# Patient Record
Sex: FEMALE | Race: WHITE | Employment: FULL TIME | URBAN - METROPOLITAN AREA
[De-identification: names, ages, dates, MRNs, and addresses within clinical notes are randomized per-mention and may not be internally consistent; named-entity substitution may affect disease eponyms.]

---

## 2017-01-09 ENCOUNTER — OFFICE VISIT (OUTPATIENT)
Dept: FAMILY MEDICINE | Facility: CLINIC | Age: 52
End: 2017-01-09
Payer: COMMERCIAL

## 2017-01-09 VITALS
TEMPERATURE: 98.5 F | HEIGHT: 62 IN | HEART RATE: 78 BPM | DIASTOLIC BLOOD PRESSURE: 85 MMHG | SYSTOLIC BLOOD PRESSURE: 129 MMHG | RESPIRATION RATE: 16 BRPM | BODY MASS INDEX: 41.41 KG/M2 | WEIGHT: 225 LBS

## 2017-01-09 DIAGNOSIS — R13.10 DYSPHAGIA, UNSPECIFIED TYPE: Primary | ICD-10-CM

## 2017-01-09 PROCEDURE — 99213 OFFICE O/P EST LOW 20 MIN: CPT | Performed by: NURSE PRACTITIONER

## 2017-01-09 NOTE — NURSING NOTE
"Chief Complaint   Patient presents with     Gastrointestinal Problem     Trouble Swallowing       Initial /91 mmHg  Pulse 80  Temp(Src) 98.5  F (36.9  C) (Tympanic)  Resp 16  Ht 5' 1.75\" (1.568 m)  Wt 225 lb (102.059 kg)  BMI 41.51 kg/m2  LMP 04/01/2010 Estimated body mass index is 41.51 kg/(m^2) as calculated from the following:    Height as of this encounter: 5' 1.75\" (1.568 m).    Weight as of this encounter: 225 lb (102.059 kg).  BP completed using cuff size: dale Arriaga, CMA      "

## 2017-01-09 NOTE — PROGRESS NOTES
SUBJECTIVE:                                                    Giselle Solis is a 51 year old female who presents to clinic today for the following health issues:      Gastrointestinal symptoms      Duration: 3 weeks     Description:           REFLUX SYMPTOMS - heartburn, nausea, problems swallowing solids and problems swallowing solids and liquids    Intensity:  moderate    Accompanying signs and symptoms:  none    History  Previous  similar problem: no   Previous evaluation:  none    Aggravating factors: meals, meat     Alleviating factors: nothing    Other Therapies tried: None       Problem list and histories reviewed & adjusted, as indicated.    Additional history:   For years can get a sensation of a knot in her upper chest when eating. Has never caused trouble with swallowing.  Was eating dinner 12/24/2016 when the food would not go down and she vomited it back up. She was eating meat but says it was well chewed and came up looking the same.   Since then has had similar episodes not as severe. Sometimes even liquids are hard to go down. Rare heartburn. No change in BM's.  Not under stress. Works from home.     Patient Active Problem List   Diagnosis     HYPERLIPIDEMIA LDL GOAL <130     Scalp psoriasis     Generalized anxiety disorder     Inverse psoriasis     Essential hypertension, benign     Obesity, Class II, BMI 35-39.9 (H)     Past Surgical History   Procedure Laterality Date     No history of surgery       Colonoscopy N/A 2/16/2015     Procedure: COLONOSCOPY;  Surgeon: Andrey Ryan MD;  Location:  GI       Social History   Substance Use Topics     Smoking status: Never Smoker      Smokeless tobacco: Never Used     Alcohol Use: No     Family History   Problem Relation Age of Onset     Adopted: Yes         Current Outpatient Prescriptions   Medication Sig Dispense Refill     omeprazole (PRILOSEC) 20 MG CR capsule Take 1 capsule (20 mg) by mouth 2 times daily 60 capsule 1     losartan (COZAAR)  "50 MG tablet Take 1 tablet (50 mg) by mouth daily 90 tablet 1     citalopram (CELEXA) 20 MG tablet TAKE 1 TABLET DAILY 90 tablet 2     calcium carbonate (OS-MADELIN 500 MG [place]. CA) 500 MG tablet Take 500 mg by mouth 2 times daily       Misc Natural Products (OSTEO BI-FLEX ADV TRIPLE ST) TABS Take 2 capsules by mouth.       MULTI-VITAMIN OR TABS 1 TABLET DAILY       ASPIRIN 81 MG OR TABS ONE DAILY         ROS:  C: NEGATIVE for fever, chills, change in weight  E/M: NEGATIVE for ear, mouth and throat problems  R: NEGATIVE for significant cough or SOB  CV: NEGATIVE for chest pain, palpitations or peripheral edema    OBJECTIVE:                                                    /85 mmHg  Pulse 78  Temp(Src) 98.5  F (36.9  C) (Tympanic)  Resp 16  Ht 5' 1.75\" (1.568 m)  Wt 225 lb (102.059 kg)  BMI 41.51 kg/m2  LMP 04/01/2010  Body mass index is 41.51 kg/(m^2).   GENERAL: healthy, alert, well nourished, well hydrated, no distress  HENT: ear canals- normal; TMs- normal; Nose- normal; Mouth- no ulcers, no lesions  NECK: no tenderness, no adenopathy, no asymmetry, no masses, no stiffness; thyroid- normal to palpation  RESP: lungs clear to auscultation - no rales, no rhonchi, no wheezes  CV: regular rates and rhythm, normal S1 S2, no S3 or S4 and no murmur, no click or rub   ABDOMEN: soft, no tenderness, no  hepatosplenomegaly, no masses, normal bowel sounds         ASSESSMENT/PLAN:                                                        ICD-10-CM    1. Dysphagia, unspecified type R13.10 omeprazole (PRILOSEC) 20 MG CR capsule       Reluctant to have UGI as advised due to cost. Has high deductible insurnace and just finished paying off a large bill with [place] [facility] for elevated liver function tests. Test have gone back to normal.   Will try omeprazole 20mg BID and assess  Call if this doesn't work. Will then need to have UGI     ARACELIS Hogan [facility]      "

## 2017-01-09 NOTE — MR AVS SNAPSHOT
"              After Visit Summary   1/9/2017    Giselle Solis    MRN: [id number]           Patient Information     Date Of Birth          1965        Visit Information        Provider Department      1/9/2017 3:00 PM Samantha Varela APRN CNP [facility] [place]        Today's Diagnoses     Dysphagia, unspecified type    -  1        Follow-ups after your visit        Who to contact     If you have questions or need follow up information about today's clinic visit or your schedule please contact [facility] [place] directly at [phone].  Normal or non-critical lab and imaging results will be communicated to you by Bet[employer], letter or phone within 4 business days after the clinic has received the results. If you do not hear from us within 7 days, please contact the clinic through Betablehart or phone. If you have a critical or abnormal lab result, we will notify you by phone as soon as possible.  Submit refill requests through [employer] or call your pharmacy and they will forward the refill request to us. Please allow 3 business days for your refill to be completed.          Additional Information About Your Visit        MyChart Information     [employer] gives you secure access to your electronic health record. If you see a primary care provider, you can also send messages to your care team and make appointments. If you have questions, please call your primary care clinic.  If you do not have a primary care provider, please call [phone] and they will assist you.        Care EveryWhere ID     This is your Care EveryWhere ID. This could be used by other organizations to access your [place] medical records  [id number]        Your Vitals Were     Pulse Temperature Respirations Height BMI (Body Mass Index) Last Period    80 98.5  F (36.9  C) (Tympanic) 16 5' 1.75\" (1.568 m) 41.51 kg/m2 04/01/2010       Blood Pressure from Last 3 Encounters:   01/09/17 137/91   06/23/16 125/79   02/25/16 104/71    " Weight from Last 3 Encounters:   01/09/17 225 lb (102.059 kg)   06/23/16 202 lb (91.627 kg)   02/25/16 174 lb (78.926 kg)              Today, you had the following     No orders found for display         Today's Medication Changes          These changes are accurate as of: 1/9/17  3:38 PM.  If you have any questions, ask your nurse or doctor.               Start taking these medicines.        Dose/Directions    omeprazole 20 MG CR capsule   Commonly known as:  priLOSEC   Used for:  Dysphagia, unspecified type   Started by:  Samantha Varela APRN CNP        Dose:  20 mg   Take 1 capsule (20 mg) by mouth 2 times daily   Quantity:  60 capsule   Refills:  1            Where to get your medicines      These medications were sent to [facility][id number] - [place], [place] [address], [place] [address]     Phone:  [phone]    - omeprazole 20 MG CR capsule             Primary Care Provider Office Phone # Fax #    ARACELIS Hogan [phone]       [address]  [place] [address]        Thank you!     [facility] you [facility]  [facility] your [facility]. Our goal is always to provide you with excellent care. Hearing back from our patients is one way we can continue to improve our services. Please take a few minutes to complete the written survey that you may receive in the mail after your visit with us. Thank you!             Your Updated Medication List - Protect others around you: Learn how to safely use, store and throw away your medicines at www.disposemymeds.org.          This list is accurate as of: 1/9/17  3:38 PM.  Always use your most recent med list.                   Brand Name Dispense Instructions for use    aspirin 81 MG tablet      ONE DAILY       calcium carbonate 500 MG tablet    OS-MADELIN 500 mg [place]. [place]     Take 500 mg by mouth 2 times daily       citalopram 20 MG tablet    celeXA    90  tablet    TAKE 1 TABLET DAILY       losartan 50 MG tablet    COZAAR    90 tablet    Take 1 tablet (50 mg) by mouth daily       Multi-vitamin Tabs tablet   Generic drug:  multivitamin, therapeutic with minerals      1 TABLET DAILY       omeprazole 20 MG CR capsule    priLOSEC    60 capsule    Take 1 capsule (20 mg) by mouth 2 times daily       OSTEO BI-FLEX ADV TRIPLE ST Tabs      Take 2 capsules by mouth.

## 2017-01-26 ENCOUNTER — TRANSFERRED RECORDS (OUTPATIENT)
Dept: HEALTH INFORMATION MANAGEMENT | Facility: CLINIC | Age: 52
End: 2017-01-26

## 2017-02-08 DIAGNOSIS — R13.10 DYSPHAGIA, UNSPECIFIED TYPE: Primary | ICD-10-CM

## 2017-02-08 NOTE — TELEPHONE ENCOUNTER
omeprazole (PRILOSEC) 20 MG CR capsule  Last Written Prescription Date: 1-9-2017  Last Fill Quantity: 60,  # refills: 1   Last Office Visit with [facility], [facility] or [facility] prescribing provider: 1-9-2017

## 2017-02-08 NOTE — TELEPHONE ENCOUNTER
Rx denied, patient has refills on file.   Nafisa Alcantar RN   [facility] - Triage

## 2017-04-18 DIAGNOSIS — F41.1 GENERALIZED ANXIETY DISORDER: ICD-10-CM

## 2017-04-18 NOTE — TELEPHONE ENCOUNTER
citalopram (CELEXA) 20 MG     Last Written Prescription Date: 8/15/16  Last Fill Quantity: 90, # refills: 2  Last Office Visit with [facility] primary care provider:  1/9/17   Next 5 appointments (look out 90 days)     Jun 30, 2017  9:00 AM CDT   MyChart Physical Adult with ARACELIS Hogan CNP   [facility] ([facility])    [address] [place] [id number] [phone]                   Last PHQ-9 score on record=   PHQ-9 SCORE 12/6/2013   Total Score 3

## 2017-04-19 RX ORDER — CITALOPRAM HYDROBROMIDE 20 MG/1
20 TABLET ORAL DAILY
Qty: 90 TABLET | Refills: 2 | Status: SHIPPED | OUTPATIENT
Start: 2017-04-19 | End: 2018-01-30

## 2017-04-19 NOTE — TELEPHONE ENCOUNTER
Refill approved through [facility] protocol.  Heather Gould RN  [facility]  [phone]

## 2017-05-02 DIAGNOSIS — I10 HYPERTENSION GOAL BP (BLOOD PRESSURE) < 140/90: ICD-10-CM

## 2017-05-03 RX ORDER — LOSARTAN POTASSIUM 50 MG/1
50 TABLET ORAL DAILY
Qty: 90 TABLET | Refills: 0 | Status: SHIPPED | OUTPATIENT
Start: 2017-05-03 | End: 2017-06-30

## 2017-05-03 NOTE — TELEPHONE ENCOUNTER
Medication is being filled for 1 time refill only due to:  for coverage until next scheduled appt   Next 5 appointments (look out 90 days)     Jun 30, 2017  9:00 AM CDT   MyChart Physical Adult with ARACELIS Hogan CNP   [facility] ([facility])    [address] [place] [id number]   [phone]                Yadi Talley RN

## 2017-05-03 NOTE — TELEPHONE ENCOUNTER
losartan (COZAAR) 50 MG tablet      Last Written Prescription Date: 10/27/2016  Last Fill Quantity: 90, # refills: 1  Last Office Visit with [facility], [facility] or [facility] prescribing provider: 1/9/2017  Next 5 appointments (look out 90 days)     Jun 30, 2017  9:00 AM CDT   [facility] Physical Adult with ARACELIS Hogan CNP   [facility]    [address] [place] [id number] [phone]                   Potassium   Date Value Ref Range Status   03/17/2016 3.8 3.4 - 5.3 mmol/L Final     Creatinine   Date Value Ref Range Status   03/17/2016 0.71 0.52 - 1.04 mg/dL Final     BP Readings from Last 3 Encounters:   01/09/17 129/85   06/23/16 125/79   02/25/16 104/71

## 2017-05-05 DIAGNOSIS — R13.10 DYSPHAGIA, UNSPECIFIED TYPE: ICD-10-CM

## 2017-05-05 NOTE — TELEPHONE ENCOUNTER
Prescription approved per [facility], [facility] or [facility] refill protocol.  Manju Self RN  [facility] [employer]

## 2017-05-05 NOTE — TELEPHONE ENCOUNTER
Omeprazole      Last Written Prescription Date: 2/10/17  Last Fill Quantity: 90,  # refills: 0   Last Office Visit with [facility], [facility] or [facility] prescribing provider:       1/9/17                                 Next 5 appointments (look out 90 days)     Jun 30, 2017  9:00 AM CDT   [place] Physical Adult with ARACELIS Hogan CNP   [facility]    [address] [place] [id number] [phone]                  Taylor Morales CMA

## 2017-06-28 ASSESSMENT — PATIENT HEALTH QUESTIONNAIRE - PHQ9
10. IF YOU CHECKED OFF ANY PROBLEMS, HOW DIFFICULT HAVE THESE PROBLEMS MADE IT FOR YOU TO DO YOUR WORK, TAKE CARE OF THINGS AT HOME, OR GET ALONG WITH OTHER PEOPLE: NOT DIFFICULT AT ALL
SUM OF ALL RESPONSES TO PHQ QUESTIONS 1-9: 5
SUM OF ALL RESPONSES TO PHQ QUESTIONS 1-9: 5

## 2017-06-30 ENCOUNTER — OFFICE VISIT (OUTPATIENT)
Dept: FAMILY MEDICINE | Facility: CLINIC | Age: 52
End: 2017-06-30
Payer: COMMERCIAL

## 2017-06-30 VITALS
SYSTOLIC BLOOD PRESSURE: 124 MMHG | OXYGEN SATURATION: 95 % | HEIGHT: 61 IN | WEIGHT: 229 LBS | HEART RATE: 86 BPM | TEMPERATURE: 98.1 F | BODY MASS INDEX: 43.23 KG/M2 | DIASTOLIC BLOOD PRESSURE: 86 MMHG

## 2017-06-30 DIAGNOSIS — E66.01 MORBID OBESITY WITH BMI OF 40.0-44.9, ADULT (H): ICD-10-CM

## 2017-06-30 DIAGNOSIS — L40.8 INVERSE PSORIASIS: ICD-10-CM

## 2017-06-30 DIAGNOSIS — Z00.00 ENCOUNTER FOR ROUTINE ADULT HEALTH EXAMINATION WITHOUT ABNORMAL FINDINGS: Primary | ICD-10-CM

## 2017-06-30 DIAGNOSIS — L40.9 SCALP PSORIASIS: ICD-10-CM

## 2017-06-30 DIAGNOSIS — Z91.89 LACK OF MOTIVATION: ICD-10-CM

## 2017-06-30 DIAGNOSIS — E78.5 HYPERLIPIDEMIA LDL GOAL <130: ICD-10-CM

## 2017-06-30 DIAGNOSIS — F41.1 GENERALIZED ANXIETY DISORDER: ICD-10-CM

## 2017-06-30 DIAGNOSIS — I10 ESSENTIAL HYPERTENSION, BENIGN: ICD-10-CM

## 2017-06-30 LAB
ALBUMIN SERPL-MCNC: 3.4 G/DL (ref 3.4–5)
ALP SERPL-CCNC: 86 U/L (ref 40–150)
ALT SERPL W P-5'-P-CCNC: 30 U/L (ref 0–50)
ANION GAP SERPL CALCULATED.3IONS-SCNC: 7 MMOL/L (ref 3–14)
AST SERPL W P-5'-P-CCNC: 15 U/L (ref 0–45)
BILIRUB SERPL-MCNC: 0.5 MG/DL (ref 0.2–1.3)
BUN SERPL-MCNC: 16 MG/DL (ref 7–30)
CALCIUM SERPL-MCNC: 9 MG/DL (ref 8.5–10.1)
CHLORIDE SERPL-SCNC: 106 MMOL/L (ref 94–109)
CHOLEST SERPL-MCNC: 254 MG/DL
CO2 SERPL-SCNC: 27 MMOL/L (ref 20–32)
CREAT SERPL-MCNC: 0.83 MG/DL (ref 0.52–1.04)
GFR SERPL CREATININE-BSD FRML MDRD: 71 ML/MIN/1.7M2
GLUCOSE SERPL-MCNC: 100 MG/DL (ref 70–99)
HDLC SERPL-MCNC: 50 MG/DL
LDLC SERPL CALC-MCNC: 183 MG/DL
NONHDLC SERPL-MCNC: 204 MG/DL
POTASSIUM SERPL-SCNC: 4.1 MMOL/L (ref 3.4–5.3)
PROT SERPL-MCNC: 7.9 G/DL (ref 6.8–8.8)
SODIUM SERPL-SCNC: 140 MMOL/L (ref 133–144)
TRIGL SERPL-MCNC: 105 MG/DL
TSH SERPL DL<=0.005 MIU/L-ACNC: 1.45 MU/L (ref 0.4–4)

## 2017-06-30 PROCEDURE — 99212 OFFICE O/P EST SF 10 MIN: CPT | Mod: 25 | Performed by: NURSE PRACTITIONER

## 2017-06-30 PROCEDURE — 80053 COMPREHEN METABOLIC PANEL: CPT | Performed by: NURSE PRACTITIONER

## 2017-06-30 PROCEDURE — 84443 ASSAY THYROID STIM HORMONE: CPT | Performed by: NURSE PRACTITIONER

## 2017-06-30 PROCEDURE — 80061 LIPID PANEL: CPT | Performed by: NURSE PRACTITIONER

## 2017-06-30 PROCEDURE — 36415 COLL VENOUS BLD VENIPUNCTURE: CPT | Performed by: NURSE PRACTITIONER

## 2017-06-30 PROCEDURE — 99396 PREV VISIT EST AGE 40-64: CPT | Performed by: NURSE PRACTITIONER

## 2017-06-30 RX ORDER — LOSARTAN POTASSIUM 50 MG/1
50 TABLET ORAL DAILY
Qty: 90 TABLET | Refills: 3 | Status: SHIPPED | OUTPATIENT
Start: 2017-06-30 | End: 2018-07-25

## 2017-06-30 RX ORDER — BUPROPION HYDROCHLORIDE 150 MG/1
150 TABLET ORAL EVERY MORNING
Qty: 30 TABLET | Refills: 1 | Status: SHIPPED | OUTPATIENT
Start: 2017-06-30 | End: 2017-07-31

## 2017-06-30 NOTE — NURSING NOTE
"Chief Complaint   Patient presents with     Physical     Fasting        Initial /86 (BP Location: Left arm)  Pulse 86  Temp 98.1  F (36.7  C) (Tympanic)  Ht 5' 1.5\" (1.562 m)  Wt 229 lb (103.9 kg)  LMP 04/01/2010  SpO2 95%  BMI 42.57 kg/m2 Estimated body mass index is 42.57 kg/(m^2) as calculated from the following:    Height as of this encounter: 5' 1.5\" (1.562 m).    Weight as of this encounter: 229 lb (103.9 kg).  Medication Reconciliation: complete    Current Outpatient Prescriptions   Medication Sig Dispense Refill     omeprazole (PRILOSEC) 20 MG CR capsule TAKE 1 CAPSULE TWICE A DAY 90 capsule 1     losartan (COZAAR) 50 MG tablet Take 1 tablet (50 mg) by mouth daily .  Need appointment for further refill 90 tablet 0     citalopram (CELEXA) 20 MG tablet Take 1 tablet (20 mg) by mouth daily 90 tablet 2     calcium carbonate (OS-MADELIN 500 MG [place]. CA) 500 MG tablet Take 500 mg by mouth 2 times daily       Misc Natural Products (OSTEO BI-FLEX ADV TRIPLE ST) TABS Take 2 capsules by mouth.       MULTI-VITAMIN OR TABS 1 TABLET DAILY       ASPIRIN 81 MG OR TABS ONE DAILY         David PALOMO CMA  "

## 2017-06-30 NOTE — NURSING NOTE
Prescription of losartan was faxed to [facility]  Date:June 30, 2017  Signature:Adenike HUNT

## 2017-06-30 NOTE — MR AVS SNAPSHOT
After Visit Summary   6/30/2017    Giselle Solis    MRN: [id number]           Patient Information     Date Of Birth          1965        Visit Information        Provider Department      6/30/2017 9:00 AM Samantha Varela APRN CNP [facility]        Today's Diagnoses     Encounter for routine adult health examination without abnormal findings    -  1    Hypertension goal BP (blood pressure) < 140/90        Hyperlipidemia LDL goal <130        Lack of motivation           Follow-ups after your visit        Who to contact     If you have questions or need follow up information about today's clinic visit or your schedule please contact [facility] directly at [phone].  Normal or non-critical lab and imaging results will be communicated to you by MyChart, letter or phone within 4 business days after the clinic has received the results. If you do not hear from us within 7 days, please contact the clinic through [employer] or phone. If you have a critical or abnormal lab result, we will notify you by phone as soon as possible.  Submit refill requests through [employer] or call your pharmacy and they will forward the refill request to us. Please allow 3 business days for your refill to be completed.          Additional Information About Your Visit        MyChart Information     [employer] gives you secure access to your electronic health record. If you see a primary care provider, you can also send messages to your care team and make appointments. If you have questions, please call your primary care clinic.  If you do not have a primary care provider, please call [phone] and they will assist you.        Care EveryWhere ID     This is your Care EveryWhere ID. This could be used by other organizations to access your [place] medical records  [id number]        Your Vitals Were     Pulse Temperature Height Last Period Pulse Oximetry BMI (Body Mass Index)    86 98.1  F  "(36.7  C) (Tympanic) 5' 1.5\" (1.562 m) 04/01/2010 95% 42.57 kg/m2       Blood Pressure from Last 3 Encounters:   06/30/17 124/86   01/09/17 129/85   06/23/16 125/79    Weight from Last 3 Encounters:   06/30/17 229 lb (103.9 kg)   01/09/17 225 lb (102.1 kg)   06/23/16 202 lb (91.6 kg)              We Performed the Following     Comprehensive metabolic panel     Lipid Profile with reflex to direct LDL     TSH with free T4 reflex          Today's Medication Changes          These changes are accurate as of: 6/30/17  9:34 AM.  If you have any questions, ask your nurse or doctor.               Start taking these medicines.        Dose/Directions    buPROPion 150 MG 24 hr tablet   Commonly known as:  WELLBUTRIN XL   Used for:  Lack of motivation   Started by:  Samantha Varela APRN CNP        Dose:  150 mg   Take 1 tablet (150 mg) by mouth every morning   Quantity:  30 tablet   Refills:  1         These medicines have changed or have updated prescriptions.        Dose/Directions    losartan 50 MG tablet   Commonly known as:  COZAAR   This may have changed:  additional instructions   Used for:  Hypertension goal BP (blood pressure) < 140/90   Changed by:  Samantha Varela APRN CNP        Dose:  50 mg   Take 1 tablet (50 mg) by mouth daily   Quantity:  90 tablet   Refills:  3            Where to get your medicines      These medications were sent to [facility] #[id number] - [place], [place] [address], [place] [address]     Phone:  [phone]     buPROPion 150 MG 24 hr tablet         Some of these will need a paper prescription and others can be bought over the counter.  Ask your nurse if you have questions.     Bring a paper prescription for each of these medications     losartan 50 MG tablet                Primary Care Provider Office Phone # Fax #    ARACELIS Hogan [phone]       [address]  [place] [address]      "   Equal Access to Services     [facility] Hospital[facility] : Hadii eduard vasquez romariomelvin Kavonali, wacaterinada luqadaha, qaybta kapilivasiliy lees. So [facility] [phone].    ATENCIÓN: Si habla español, tiene a pathak disposición servicios gratuitos de asistencia lingüística. Rachame al [phone].    We comply with applicable federal civil rights laws and [place] laws. We do not discriminate on the basis of race, color, national origin, age, disability sex, sexual orientation or gender identity.            Thank you!     Thank you for choosing [facility] [place]  for your care. Our goal is always to provide you with excellent care. Hearing back from our patients is one way we can continue to improve our services. Please take a few minutes to complete the written survey that you may receive in the mail after your visit with us. Thank you!             Your Updated Medication List - Protect others around you: Learn how to safely use, store and throw away your medicines at www.disposemymeds.org.          This list is accurate as of: 6/30/17  9:34 AM.  Always use your most recent med list.                   Brand Name Dispense Instructions for use Diagnosis    aspirin 81 MG tablet      ONE DAILY        buPROPion 150 MG 24 hr tablet    WELLBUTRIN XL    30 tablet    Take 1 tablet (150 mg) by mouth every morning    Lack of motivation       calcium carbonate 1250 MG tablet    OS-MADELIN 500 mg [place]. Ca     Take 500 mg by mouth 2 times daily        citalopram 20 MG tablet    celeXA    90 tablet    Take 1 tablet (20 mg) by mouth daily    Generalized anxiety disorder       losartan 50 MG tablet    COZAAR    90 tablet    Take 1 tablet (50 mg) by mouth daily    Hypertension goal BP (blood pressure) < 140/90       Multi-vitamin Tabs tablet   Generic drug:  multivitamin, therapeutic with minerals      1 TABLET DAILY        omeprazole 20 MG CR capsule    priLOSEC    90 capsule    TAKE 1 CAPSULE TWICE A DAY     Dysphagia, unspecified type       OSTEO BI-FLEX ADV TRIPLE ST Tabs      Take 2 capsules by mouth.

## 2017-06-30 NOTE — PROGRESS NOTES
"SUBJECTIVE:   CC: Giselle Solis is an 52 year old male who presents for preventative health visit.     Physical   Annual:     Getting at least 3 servings of Calcium per day::  NO    Bi-annual eye exam::  Yes    Dental care twice a year::  Yes    Sleep apnea or symptoms of sleep apnea::  None    Diet::  Regular (no restrictions)    Frequency of exercise::  1 day/week    Duration of exercise::  15-30 minutes    Taking medications regularly::  Yes    Medication side effects::  Not applicable    Additional concerns today::  YES          Today's PHQ-2 Score:   PHQ-2 ( 1999 [employer]) 6/28/2017   Q1: Little interest or pleasure in doing things 3   Q2: Feeling down, depressed or hopeless 0   PHQ-2 Score 3   Q1: Little interest or pleasure in doing things Nearly every day   Q2: Feeling down, depressed or hopeless Not at all   PHQ-2 Score 3       Abuse: Current or Past(Physical, Sexual or Emotional)- No  Do you feel safe in your environment - Yes    Social History   Substance Use Topics     Smoking status: Never Smoker     Smokeless tobacco: Never Used     Alcohol use No     The patient does not drink >3 drinks per day nor >7 drinks per week.    Last PSA: No results found for: PSA    Reviewed orders with patient. Reviewed health maintenance and updated orders accordingly - Yes  {Chronicprobdata (Optional):062878}    Reviewed and updated as needed this visit by clinical staff         Reviewed and updated as needed this visit by Provider        {HISTORY OPTIONS (Optional):575926}    ROS:  {MALE ROS-adult preventive care package:219952::\"C: NEGATIVE for fever, chills, change in weight\",\"I: NEGATIVE for worrisome rashes, moles or lesions\",\"E: NEGATIVE for vision changes or irritation\",\"ENT: NEGATIVE for ear, mouth and throat problems\",\"R: NEGATIVE for significant cough or SOB\",\"CV: NEGATIVE for chest pain, palpitations or peripheral edema\",\"GI: NEGATIVE for nausea, abdominal pain, heartburn, or change in bowel habits\",\" male: " "negative for dysuria, hematuria, decreased urinary stream, erectile dysfunction, urethral discharge\",\"M: NEGATIVE for significant arthralgias or myalgia\",\"N: NEGATIVE for weakness, dizziness or paresthesias\",\"P: NEGATIVE for changes in mood or affect\"}    OBJECTIVE:   LMP 04/01/2010    EXAM:  {Exam Choices:760925}    ASSESSMENT/PLAN:   {Diag Picklist:469862}    COUNSELING:   {MALE COUNSELING MESSAGES:091005::\"Reviewed preventive health counseling, as reflected in patient instructions\"}    {BP Counseling- Complete if BP >= 120/80  (Optional):663110}     reports that she has never smoked. She has never used smokeless tobacco.  {Tobacco Cessation -- Complete if patient is a smoker (Optional):873997}  Estimated body mass index is 41.49 kg/(m^2) as calculated from the following:    Height as of 1/9/17: 5' 1.75\" (1.568 m).    Weight as of 1/9/17: 225 lb (102.1 kg).   {Weight Management Plan (ACO) Complete if BMI is abnormal-  Ages 18-64  BMI >24.9.  Age 65+ with BMI <23 or >30 (Optional):385779}    Counseling Resources:  ATP IV Guidelines  Pooled Cohorts Equation Calculator  FRAX Risk Assessment  ICSI Preventive Guidelines  Dietary Guidelines for Americans, 2010  USDA's MyPlate  ASA Prophylaxis  Lung CA Screening    ARACELIS Hogan CNP  [facility] [place]  "

## 2017-07-02 ASSESSMENT — ANXIETY QUESTIONNAIRES
5. BEING SO RESTLESS THAT IT IS HARD TO SIT STILL: NOT AT ALL
7. FEELING AFRAID AS IF SOMETHING AWFUL MIGHT HAPPEN: NOT AT ALL
6. BECOMING EASILY ANNOYED OR IRRITABLE: NOT AT ALL
2. NOT BEING ABLE TO STOP OR CONTROL WORRYING: NOT AT ALL
IF YOU CHECKED OFF ANY PROBLEMS ON THIS QUESTIONNAIRE, HOW DIFFICULT HAVE THESE PROBLEMS MADE IT FOR YOU TO DO YOUR WORK, TAKE CARE OF THINGS AT HOME, OR GET ALONG WITH OTHER PEOPLE: NOT DIFFICULT AT ALL
3. WORRYING TOO MUCH ABOUT DIFFERENT THINGS: NOT AT ALL
1. FEELING NERVOUS, ANXIOUS, OR ON EDGE: NOT AT ALL
GAD7 TOTAL SCORE: 0

## 2017-07-02 ASSESSMENT — PATIENT HEALTH QUESTIONNAIRE - PHQ9: 5. POOR APPETITE OR OVEREATING: NOT AT ALL

## 2017-07-03 PROBLEM — E66.01 MORBID OBESITY WITH BMI OF 40.0-44.9, ADULT (H): Status: ACTIVE | Noted: 2017-07-03

## 2017-07-03 ASSESSMENT — ANXIETY QUESTIONNAIRES: GAD7 TOTAL SCORE: 0

## 2017-07-03 NOTE — PROGRESS NOTES
SUBJECTIVE:   CC: Giselle Solis is an 52 year old woman who presents for preventive health visit.     Physical   Annual:     Getting at least 3 servings of Calcium per day::  NO    Bi-annual eye exam::  Yes    Dental care twice a year::  Yes    Sleep apnea or symptoms of sleep apnea::  None    Diet::  Regular (no restrictions)    Frequency of exercise::  1 day/week    Duration of exercise::  15-30 minutes    Taking medications regularly::  Yes    Medication side effects::  Not applicable    Additional concerns today::  YES             Today's PHQ-2 Score:   PHQ-2 ( 1999 Pf[employer]) 6/28/2017   Q1: Little interest or pleasure in doing things Nearly every day   Q2: Feeling down, depressed or hopeless Not at all   PHQ-2 Score 3       Abuse: Current or Past(Physical, Sexual or Emotional)- No  Do you feel safe in your environment - Yes    Social History   Substance Use Topics     Smoking status: Never Smoker     Smokeless tobacco: Never Used     Alcohol use No     The patient does not drink >3 drinks per day nor >7 drinks per week.    Reviewed orders with patient.  Reviewed health maintenance and updated orders accordingly - Yes       Patient over age 50, mutual decision to screen reflected in health maintenance.    Pertinent mammograms are reviewed under the imaging tab.  History of abnormal Pap smear: NO - age 30- 65 PAP every 3 years recommended    Reviewed and updated as needed this visit by clinical staff  Tobacco  Allergies  Meds  Soc Hx        Reviewed and updated as needed this visit by Provider        Past Medical History:   Diagnosis Date     Generalised anxiety disorder 2010     Inverse psoriasis 2013     Menopause 2010     Mixed hyperlipidemia      Obesity (BMI 30-39.9)      Scalp psoriasis      Snores     sleep study 2013- no apnea     Unspecified essential hypertension      Uterine leiomyoma - fibroids       Past Surgical History:   Procedure Laterality Date     COLONOSCOPY N/A 2/16/2015    Procedure:  "COLONOSCOPY;  Surgeon: Andrey Ryan MD;  Location:  GI     NO HISTORY OF SURGERY       Social Hx: Single. 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     ROS:  C: NEGATIVE for fever, chills, gained another 27# (55 # in 2 years) .   I: NEGATIVE for worrisome rashes, moles or lesions. Steroid topicals for scalp psoriasis and inverse psoriasis under breasts and lower abdomen.  Washes with Selsun Blue as needed.   E: NEGATIVE for vision changes or irritation; glasses  ENT: NEGATIVE for ear, mouth and throat problems  R: NEGATIVE for significant cough or SOB  B: NEGATIVE for masses, tenderness or discharge  CV: NEGATIVE for chest pain, palpitations or peripheral edema  GI: NEGATIVE for nausea, abdominal pain, heartburn, or change in bowel habits  : NEGATIVE for unusual urinary or vaginal symptoms. No vaginal bleeding.  M: NEGATIVE for significant arthralgias or myalgia  N: NEGATIVE for weakness, dizziness or paresthesias  P: NEGATIVE for changes in mood or affect. Takes citalopram for anxiety and this works well. Past several months has no motivation to do anything. Doesn't really feel depressed just no interest.  Believes this has contributed to her weight gain.   PHQ 9= 0  GAD7 = 0     OBJECTIVE:   /86 (BP Location: Left arm)  Pulse 86  Temp 98.1  F (36.7  C) (Tympanic)  Ht 5' 1.5\" (1.562 m)  Wt 229 lb (103.9 kg)  LMP 04/01/2010  SpO2 95%  BMI 42.57 kg/m2  EXAM:  GENERAL APPEARANCE: healthy, alert and no distress  EYES: Eyes grossly normal to inspection, PERRL and conjunctivae and sclerae normal  HENT: ear canals and TM's normal, nose and mouth without ulcers or lesions, oropharynx clear and oral mucous membranes moist  NECK: no adenopathy, no asymmetry, masses, or scars and thyroid normal to palpation  RESP: lungs clear to auscultation - no rales, rhonchi or wheezes  BREAST: normal without masses, tenderness or nipple discharge and no palpable axillary masses or adenopathy  CV: " "regular rate and rhythm, normal S1 S2, no S3 or S4, no murmur, click or rub, no peripheral edema and peripheral pulses strong  ABDOMEN: soft, nontender, no hepatosplenomegaly, no masses and bowel sounds normal  MS: no musculoskeletal defects are noted and gait is age appropriate without ataxia  SKIN: moderate erythema under right breast and in groin   NEURO: Normal strength and tone, sensory exam grossly normal, mentation intact and speech normal  PSYCH: mentation appears normal and affect normal/bright    ASSESSMENT/PLAN:   Giselle was seen today for physical.    Diagnoses and all orders for this visit:    Encounter for routine adult health examination without abnormal findings    Lack of motivation  -     TSH with free T4 reflex  -     buPROPion (WELLBUTRIN XL) 150 MG 24 hr tablet; Take 1 tablet (150 mg) by mouth every morning    Essential hypertension, benign    Generalized anxiety disorder    Hyperlipidemia LDL goal <130  -     Lipid Profile with reflex to direct LDL    Scalp psoriasis    Inverse psoriasis    Morbid obesity with BMI of 40.0-44.9, adult (H)    Other orders  -     losartan (COZAAR) 50 MG tablet; Take 1 tablet (50 mg) by mouth daily  -     Comprehensive metabolic panel      Discussed lack of motivation  Will try Wellbutrin. Continue citalopram  I have discussed with patient the risks, benefits, medications, treatment options and modalities.  Will need to follow up with new provider since I am retiring     COUNSELING:  Reviewed preventive health counseling, as reflected in patient instructions  Special attention given to:        Regular exercise       Healthy diet/nutrition       Vision screening       Osteoporosis Prevention/Bone Health         reports that she has never smoked. She has never used smokeless tobacco.    Estimated body mass index is 42.57 kg/(m^2) as calculated from the following:    Height as of this encounter: 5' 1.5\" (1.562 m).    Weight as of this encounter: 229 lb (103.9 kg). "   Weight management plan: Discussed healthy diet and exercise guidelines and patient will follow up in 12 months in clinic to re-evaluate.    Counseling Resources:  ATP IV Guidelines  Pooled Cohorts Equation Calculator  Breast Cancer Risk Calculator  FRAX Risk Assessment  ICSI Preventive Guidelines  Dietary Guidelines for Americans, 2010  USDA's MyPlate  ASA Prophylaxis  Lung CA Screening    ARACELIS Hogan CNP  [facility] [place]  Answers for HPI/ROS submitted by the patient on 6/28/2017   PHQ-2 Score: 3  If you checked off any problems, how difficult have these problems made it for you to do your work, take care of things at home, or get along with other people?: Not difficult at all  PHQ9 TOTAL SCORE: 5

## 2017-07-18 DIAGNOSIS — Z91.89 LACK OF MOTIVATION: ICD-10-CM

## 2017-07-18 NOTE — TELEPHONE ENCOUNTER
buPROPion (WELLBUTRIN XL) 150 MG 24 hr tablet  Last Written Prescription Date: 6-  Last Fill Quantity: 30; # refills: 1  Last Office Visit with [facility], [facility] or [facility] prescribing provider:  6-        Last PHQ-9 score on record=   PHQ-9 SCORE 7/2/2017   Total Score -   Total Score MyChart -   Total Score 0       Lab Results   Component Value Date    AST 15 06/30/2017     Lab Results   Component Value Date    ALT 30 06/30/2017

## 2017-07-19 RX ORDER — BUPROPION HYDROCHLORIDE 150 MG/1
150 TABLET ORAL EVERY MORNING
Qty: 30 TABLET | Refills: 1 | OUTPATIENT
Start: 2017-07-19

## 2017-07-19 NOTE — TELEPHONE ENCOUNTER
Has refill at local pharmacy- needs appointment prior to 90 day supply request- sent info to pharmacy  Patient was notified by ARACELIS Hogan CNP that she would need follow up with new provider per last note.  This is a new medication start and patient was only given a 30 day supply with one refill- will need a follow up prior to refills with a new provider.    Left message on answering machine for patient to call back.        Heather Gould RN  [facility] [place]  [phone]

## 2017-07-19 NOTE — TELEPHONE ENCOUNTER
Patient given message below. States that she will look online to decide which provider she would like to schedule with. She states that she will schedule online or call back to schedule. Patient understands that she needs appt for future refills. Marni Pacheco RN

## 2017-07-25 ENCOUNTER — TELEPHONE (OUTPATIENT)
Dept: FAMILY MEDICINE | Facility: CLINIC | Age: 52
End: 2017-07-25

## 2017-07-25 NOTE — TELEPHONE ENCOUNTER
Form completed and faxed. Original mailed to the pt and copy sent to [facility].  Allegra De La Cruz,

## 2017-07-31 ENCOUNTER — OFFICE VISIT (OUTPATIENT)
Dept: FAMILY MEDICINE | Facility: CLINIC | Age: 52
End: 2017-07-31
Payer: COMMERCIAL

## 2017-07-31 VITALS
BODY MASS INDEX: 42.51 KG/M2 | HEART RATE: 64 BPM | RESPIRATION RATE: 16 BRPM | SYSTOLIC BLOOD PRESSURE: 128 MMHG | TEMPERATURE: 97.8 F | OXYGEN SATURATION: 98 % | WEIGHT: 231 LBS | DIASTOLIC BLOOD PRESSURE: 82 MMHG | HEIGHT: 62 IN

## 2017-07-31 DIAGNOSIS — Z91.89 LACK OF MOTIVATION: Primary | ICD-10-CM

## 2017-07-31 DIAGNOSIS — E66.01 MORBID OBESITY WITH BMI OF 40.0-44.9, ADULT (H): ICD-10-CM

## 2017-07-31 PROCEDURE — 99213 OFFICE O/P EST LOW 20 MIN: CPT | Performed by: PHYSICIAN ASSISTANT

## 2017-07-31 RX ORDER — BUPROPION HYDROCHLORIDE 150 MG/1
150 TABLET ORAL EVERY MORNING
Qty: 90 TABLET | Refills: 1 | Status: SHIPPED | OUTPATIENT
Start: 2017-07-31 | End: 2018-05-17

## 2017-07-31 RX ORDER — BUPROPION HYDROCHLORIDE 150 MG/1
150 TABLET ORAL EVERY MORNING
Qty: 10 TABLET | Refills: 0 | Status: SHIPPED | OUTPATIENT
Start: 2017-07-31 | End: 2017-07-31

## 2017-07-31 ASSESSMENT — ANXIETY QUESTIONNAIRES
GAD7 TOTAL SCORE: 0
5. BEING SO RESTLESS THAT IT IS HARD TO SIT STILL: NOT AT ALL
3. WORRYING TOO MUCH ABOUT DIFFERENT THINGS: NOT AT ALL
IF YOU CHECKED OFF ANY PROBLEMS ON THIS QUESTIONNAIRE, HOW DIFFICULT HAVE THESE PROBLEMS MADE IT FOR YOU TO DO YOUR WORK, TAKE CARE OF THINGS AT HOME, OR GET ALONG WITH OTHER PEOPLE: NOT DIFFICULT AT ALL
2. NOT BEING ABLE TO STOP OR CONTROL WORRYING: NOT AT ALL
1. FEELING NERVOUS, ANXIOUS, OR ON EDGE: NOT AT ALL
6. BECOMING EASILY ANNOYED OR IRRITABLE: NOT AT ALL
7. FEELING AFRAID AS IF SOMETHING AWFUL MIGHT HAPPEN: NOT AT ALL

## 2017-07-31 ASSESSMENT — PATIENT HEALTH QUESTIONNAIRE - PHQ9: 5. POOR APPETITE OR OVEREATING: NOT AT ALL

## 2017-07-31 NOTE — MR AVS SNAPSHOT
After Visit Summary   7/31/2017    Giselle Solis    MRN: [id number]           Patient Information     Date Of Birth          1965        Visit Information        Provider Department      7/31/2017 9:20 AM Amie Grace PA-C [facility]        Today's Diagnoses     Lack of motivation    -  1    Morbid obesity with BMI of 40.0-44.9, adult (H)           Follow-ups after your visit        Follow-up notes from your care team     Return in about 6 months (around 1/31/2018) for Medication Check, Lab Work.      Who to contact     If you have questions or need follow up information about today's clinic visit or your schedule please [facility] directly at [phone].  Normal or non-critical lab and imaging results will be communicated to you by MyChart, letter or phone within 4 business days after the clinic has received the results. If you do not hear from us within 7 days, please contact the clinic through MyChart or phone. If you have a critical or abnormal lab result, we will notify you by phone as soon as possible.  Submit refill requests through [employer] or call your pharmacy and they will forward the refill request to us. Please allow 3 business days for your refill to be completed.          Additional Information About Your Visit        MyChart Information     [employer] gives you secure access to your electronic health record. If you see a primary care provider, you can also send messages to your care team and make appointments. If you have questions, please call your primary care clinic.  If you do not have a primary care provider, please call [phone] and they will assist you.        Care EveryWhere ID     This is your Care EveryWhere ID. This could be used by other organizations to access your [place] medical records  [id number]        Your Vitals Were     Pulse Temperature Respirations Height Last Period Pulse Oximetry    64 97.8  " F (36.6  C) 16 5' 1.75\" (1.568 m) 04/01/2010 98%    BMI (Body Mass Index)                   42.59 kg/m2            Blood Pressure from Last 3 Encounters:   07/31/17 128/82   06/30/17 124/86   01/09/17 129/85    Weight from Last 3 Encounters:   07/31/17 231 lb (104.8 kg)   06/30/17 229 lb (103.9 kg)   01/09/17 225 lb (102.1 kg)              Today, you had the following     No orders found for display         Today's Medication Changes          These changes are accurate as of: 7/31/17 10:04 AM.  If you have any questions, ask your nurse or doctor.               Start taking these medicines.        Dose/Directions    buPROPion 150 MG 24 hr tablet   Commonly known as:  WELLBUTRIN XL   Used for:  Lack of motivation   Started by:  Amie Grace PA-C        Dose:  150 mg   Take 1 tablet (150 mg) by mouth every morning   Quantity:  90 tablet   Refills:  1         These medicines have changed or have updated prescriptions.        Dose/Directions    OMEPRAZOLE PO   This may have changed:  Another medication with the same name was removed. Continue taking this medication, and follow the directions you see here.   Changed by:  Amie Grace PA-C        Dose:  20 mg   Take 20 mg by mouth daily   Refills:  0            Where to get your medicines      Some of these will need a paper prescription and others can be bought over the counter.  Ask your nurse if you have questions.     Bring a paper prescription for each of these medications     buPROPion 150 MG 24 hr tablet                Primary Care Provider Office Phone # Fax #    Samantha RobleroARACELIS fernández [phone]       [address]  [place] [address]        Equal Access to Services     [facility] SHIVA AH: Bita Jones, marlene horne, gatito santos, vasiliy chao. [facility] [phone].    ATENCIÓN: Si habla español, tiene a pathak disposición servicios gratuitos " de asistencia lingüística. William yost [phone].    We comply with applicable federal civil rights laws and [place] laws. We do not discriminate on the basis of race, color, national origin, age, disability sex, sexual orientation or gender identity.            Thank you!     Thank you for choosing [facility] [place]  for your care. Our goal is always to provide you with excellent care. Hearing back from our patients is one way we can continue to improve our services. Please take a few minutes to complete the written survey that you may receive in the mail after your visit with us. Thank you!             Your Updated Medication List - Protect others around you: Learn how to safely use, store and throw away your medicines at www.disposemymeds.org.          This list is accurate as of: 7/31/17 10:04 AM.  Always use your most recent med list.                   Brand Name Dispense Instructions for use Diagnosis    aspirin 81 MG tablet      ONE DAILY        buPROPion 150 MG 24 hr tablet    WELLBUTRIN XL    90 tablet    Take 1 tablet (150 mg) by mouth every morning    Lack of motivation       calcium carbonate 1250 MG tablet    OS-MADELIN 500 mg [place]. [place]     Take 500 mg by mouth 2 times daily        citalopram 20 MG tablet    celeXA    90 tablet    Take 1 tablet (20 mg) by mouth daily    Generalized anxiety disorder       losartan 50 MG tablet    COZAAR    90 tablet    Take 1 tablet (50 mg) by mouth daily        Multi-vitamin Tabs tablet   Generic drug:  multivitamin, therapeutic with minerals      1 TABLET DAILY        OMEPRAZOLE PO      Take 20 mg by mouth daily        OSTEO BI-FLEX ADV TRIPLE ST Tabs      Take 2 capsules by mouth.

## 2017-07-31 NOTE — PROGRESS NOTES
"Chief Complaint   Patient presents with     Recheck Medication       Initial /82  Pulse 64  Temp 97.8  F (36.6  C)  Resp 16  Ht 5' 1.75\" (1.568 m)  Wt 231 lb (104.8 kg)  LMP 04/01/2010  SpO2 98%  BMI 42.59 kg/m2 Estimated body mass index is 42.59 kg/(m^2) as calculated from the following:    Height as of this encounter: 5' 1.75\" (1.568 m).    Weight as of this encounter: 231 lb (104.8 kg).  Medication Reconciliation: complete. SILVIANO Morse LPN        SUBJECTIVE:                                                    Giselle Solis is a 52 year old female who presents to clinic today for the following health issues:      Medication Followup of Wellbutrin    Taking Medication as prescribed: yes    Side Effects:  None    Medication Helping Symptoms:  yes   Prior PCP started her on Wellbutrin d/t lack of motivation. Has gained over 75 lbs in last 2 yrs.   Taking Celexa 20 mg daily.   She feels this is helping. Has been taking almost 4 weeks now. Opts to continue.   She works for [employer].   -------------------------------------    Problem list and histories reviewed & adjusted, as indicated.  Additional history: as documented    Patient Active Problem List   Diagnosis     HYPERLIPIDEMIA LDL GOAL <130     Scalp psoriasis     Generalized anxiety disorder     Inverse psoriasis     Essential hypertension, benign     Morbid obesity with BMI of 40.0-44.9, adult (H)     Past Surgical History:   Procedure Laterality Date     COLONOSCOPY N/A 2/16/2015    Procedure: COLONOSCOPY;  Surgeon: Andrey Ryan MD;  Location:  GI     NO HISTORY OF SURGERY         Social History   Substance Use Topics     Smoking status: Never Smoker     Smokeless tobacco: Never Used     Alcohol use No     Family History   Problem Relation Age of Onset     Adopted: Yes         Current Outpatient Prescriptions   Medication Sig Dispense Refill     OMEPRAZOLE PO Take 20 mg by mouth daily       buPROPion (WELLBUTRIN XL) 150 MG 24 hr " tablet Take 1 tablet (150 mg) by mouth every morning 90 tablet 1     losartan (COZAAR) 50 MG tablet Take 1 tablet (50 mg) by mouth daily 90 tablet 3     citalopram (CELEXA) 20 MG tablet Take 1 tablet (20 mg) by mouth daily 90 tablet 2     calcium carbonate (OS-MADELIN 500 MG [place]. [place]) 500 MG tablet Take 500 mg by mouth 2 times daily       Misc Natural Products (OSTEO BI-FLEX ADV TRIPLE ST) TABS Take 2 capsules by mouth.       MULTI-VITAMIN OR TABS 1 TABLET DAILY       ASPIRIN 81 MG OR TABS ONE DAILY       [DISCONTINUED] buPROPion (WELLBUTRIN XL) 150 MG 24 hr tablet Take 1 tablet (150 mg) by mouth every morning 10 tablet 0     [DISCONTINUED] buPROPion (WELLBUTRIN XL) 150 MG 24 hr tablet Take 1 tablet (150 mg) by mouth every morning 30 tablet 1     Allergies   Allergen Reactions     Lisinopril Cough     Sulfa Drugs      Labs reviewed in EPIC      Reviewed and updated as needed this visit by clinical staffTobacco  Allergies  Meds  Fam Hx  Soc Hx      Reviewed and updated as needed this visit by Provider         Social History     Social History     Marital status: Single     Spouse name: single     Number of children: 0     Years of education: N/A     Occupational History     customer service      Social History Main Topics     Smoking status: Never Smoker     Smokeless tobacco: Never Used     Alcohol use No     Drug use: No     Sexual activity: No     Other Topics Concern      Service No     Blood Transfusions No     Caffeine Concern Yes     3 sodas daily     Occupational Exposure Yes     works with pets     Hobby Hazards No     Sleep Concern No     Stress Concern No     Weight Concern No     Special Diet No     Back Care No     Exercise No     Bike Helmet No     Seat Belt Yes     Self-Exams Yes     Social History Narrative       10 point review of systems negative other than symptoms noted above.   Constitutional, HEENT, CV, pulmonary, GI, , MS, Endo, Psych systems are all negative, except as otherwise  "noted.       OBJECTIVE:  /82  Pulse 64  Temp 97.8  F (36.6  C)  Resp 16  Ht 5' 1.75\" (1.568 m)  Wt 231 lb (104.8 kg)  LMP 04/01/2010  SpO2 98%  BMI 42.59 kg/m2  CONSTITUTIONAL: Alert, well-nourished, well-groomed, NAD  RESP: Lungs CTA. No wheeze, rhonchi, rales. Normal effort on room air. Equal lung sounds bilaterally.   CV: HRRR, normal S1, S2. No MRG. No peripheral edema.  DERM: No rashes or suspicious lesions  PSYCH: Alert and oriented. Thought process is clear and goal-directed. Adequate insight and judgement. Mood - normal. Affect - normal.      Diagnostic Tests:  PHQ: 0  BALDEMAR: 0    ASSESSMENT/PLAN:  (Z91.89) Lack of motivation  (primary encounter diagnosis)  Comment: OK to continue. Sent temp supply to [facility] as she has one left. Then mail order x6 mo, then return for medication check.   Plan: buPROPion (WELLBUTRIN XL) 150 MG 24 hr tablet,         DISCONTINUED: buPROPion (WELLBUTRIN XL) 150 MG         24 hr tablet            (E66.01,  Z68.41) Morbid obesity with BMI of 40.0-44.9, adult (H)  Comment:   Plan: Weight management plan: Discussed healthy diet and exercise guidelines and patient will follow up in 6 months in clinic to re-evaluate.        FOLLOW-UP: 6 mo medication check, fasing labs - recheck lipids. Sooner prn.   The patient agrees with this assessment and plan and agrees to call or return to the clinic with any questions or concerns or if their condition worsens.    LALI Gilmore, PA-C  [facility]          "

## 2017-08-01 ASSESSMENT — PATIENT HEALTH QUESTIONNAIRE - PHQ9: SUM OF ALL RESPONSES TO PHQ QUESTIONS 1-9: 0

## 2017-08-01 ASSESSMENT — ANXIETY QUESTIONNAIRES: GAD7 TOTAL SCORE: 0

## 2018-01-15 DIAGNOSIS — F41.1 GENERALIZED ANXIETY DISORDER: ICD-10-CM

## 2018-01-15 NOTE — TELEPHONE ENCOUNTER
"Requested Prescriptions   Pending Prescriptions Disp Refills     citalopram (CELEXA) 20 MG tablet  Last Written Prescription Date:  4-  Last Fill Quantity: 90 tablet,  # refills: 2   Last Office Visit with [facility], [facility] or [facility] prescribing provider:  7-  Future Office Visit:      90 tablet 2     Sig: Take 1 tablet (20 mg) by mouth daily    SSRIs Protocol Passed  PHQ-9 SCORE 6/28/2017 7/2/2017 7/31/2017   Total Score - - -   Total Score MyChart 5 (Mild depression) - -   Total Score 5 0 0     BALDEMAR-7 SCORE 2/25/2016 7/2/2017 7/31/2017   Total Score - - -   Total Score 1 0 0           1/15/2018  9:50 AM       Passed - Recent or future visit with authorizing provider    Patient had office visit in the last year or has a visit in the next 30 days with authorizing provider.  See \"Patient Info\" tab in inbasket, or \"Choose Columns\" in Meds & Orders section of the refill encounter.              Passed - Patient is age 18 or older       Passed - No active pregnancy on record       Passed - No positive pregnancy test in last 12 months          "

## 2018-01-17 RX ORDER — CITALOPRAM HYDROBROMIDE 20 MG/1
20 TABLET ORAL DAILY
Qty: 90 TABLET | Refills: 2 | OUTPATIENT
Start: 2018-01-17

## 2018-01-17 NOTE — TELEPHONE ENCOUNTER
Patient frustrated to have to come in early to establish care with new provider for a medication that she has been on for 8 years. States that she has on hand a 3 month supply, but that she got an auto-refill request from [facility].  Rx requested returned to [facility] with note stating OV required for further refills.  Patient will call back to schedule.  Marni Pacheco RN

## 2018-01-30 DIAGNOSIS — F41.1 GENERALIZED ANXIETY DISORDER: ICD-10-CM

## 2018-01-30 RX ORDER — CITALOPRAM HYDROBROMIDE 20 MG/1
20 TABLET ORAL DAILY
Qty: 90 TABLET | Refills: 0 | Status: SHIPPED | OUTPATIENT
Start: 2018-01-30 | End: 2018-05-17

## 2018-01-30 NOTE — TELEPHONE ENCOUNTER
One time lenin refill given.  Patient sent my chart to establish care.  Manju Self RN - Triage  [facility]

## 2018-01-30 NOTE — TELEPHONE ENCOUNTER
"Requested Prescriptions   Pending Prescriptions Disp Refills     citalopram (CELEXA) 20 MG tablet  Last Written Prescription Date:  4/19/17  Last Fill Quantity: 90,  # refills: 2   Last Office Visit with [facility] provider:  7/31/17   Future Office Visit:      90 tablet 2     Sig: Take 1 tablet (20 mg) by mouth daily    SSRIs Protocol Passed    1/30/2018  8:32 AM       Passed - Recent or future visit with authorizing provider    Patient had office visit in the last year or has a visit in the next 30 days with authorizing provider.  See \"Patient Info\" tab in inbasket, or \"Choose Columns\" in Meds & Orders section of the refill encounter.            Passed - Patient is age 18 or older       Passed - No active pregnancy on record       Passed - No positive pregnancy test in last 12 months          "

## 2018-05-17 ENCOUNTER — OFFICE VISIT (OUTPATIENT)
Dept: FAMILY MEDICINE | Facility: CLINIC | Age: 53
End: 2018-05-17
Payer: COMMERCIAL

## 2018-05-17 VITALS
WEIGHT: 236 LBS | DIASTOLIC BLOOD PRESSURE: 86 MMHG | HEIGHT: 62 IN | BODY MASS INDEX: 43.43 KG/M2 | SYSTOLIC BLOOD PRESSURE: 124 MMHG | OXYGEN SATURATION: 95 % | TEMPERATURE: 97.7 F | HEART RATE: 86 BPM

## 2018-05-17 DIAGNOSIS — B37.2 CANDIDIASIS OF SKIN: ICD-10-CM

## 2018-05-17 DIAGNOSIS — I10 ESSENTIAL HYPERTENSION, BENIGN: ICD-10-CM

## 2018-05-17 DIAGNOSIS — Z13.9 SCREENING FOR CONDITION: ICD-10-CM

## 2018-05-17 DIAGNOSIS — E66.01 MORBID OBESITY WITH BMI OF 40.0-44.9, ADULT (H): ICD-10-CM

## 2018-05-17 DIAGNOSIS — E78.5 HYPERLIPIDEMIA LDL GOAL <130: ICD-10-CM

## 2018-05-17 DIAGNOSIS — Z91.89 LACK OF MOTIVATION: ICD-10-CM

## 2018-05-17 DIAGNOSIS — Z12.39 BREAST CANCER SCREENING: ICD-10-CM

## 2018-05-17 DIAGNOSIS — Z00.00 ROUTINE GENERAL MEDICAL EXAMINATION AT A HEALTH CARE FACILITY: Primary | ICD-10-CM

## 2018-05-17 DIAGNOSIS — R53.83 OTHER FATIGUE: ICD-10-CM

## 2018-05-17 DIAGNOSIS — F41.1 GENERALIZED ANXIETY DISORDER: ICD-10-CM

## 2018-05-17 LAB
ALBUMIN SERPL-MCNC: 3.2 G/DL (ref 3.4–5)
ALP SERPL-CCNC: 90 U/L (ref 40–150)
ALT SERPL W P-5'-P-CCNC: 85 U/L (ref 0–50)
ANION GAP SERPL CALCULATED.3IONS-SCNC: 8 MMOL/L (ref 3–14)
AST SERPL W P-5'-P-CCNC: 21 U/L (ref 0–45)
BILIRUB SERPL-MCNC: 0.4 MG/DL (ref 0.2–1.3)
BUN SERPL-MCNC: 16 MG/DL (ref 7–30)
CALCIUM SERPL-MCNC: 9.1 MG/DL (ref 8.5–10.1)
CHLORIDE SERPL-SCNC: 108 MMOL/L (ref 94–109)
CHOLEST SERPL-MCNC: 245 MG/DL
CO2 SERPL-SCNC: 26 MMOL/L (ref 20–32)
CREAT SERPL-MCNC: 0.76 MG/DL (ref 0.52–1.04)
DEPRECATED CALCIDIOL+CALCIFEROL SERPL-MC: 38 UG/L (ref 20–75)
ERYTHROCYTE [DISTWIDTH] IN BLOOD BY AUTOMATED COUNT: 14.3 % (ref 10–15)
GFR SERPL CREATININE-BSD FRML MDRD: 79 ML/MIN/1.7M2
GLUCOSE SERPL-MCNC: 103 MG/DL (ref 70–99)
HCT VFR BLD AUTO: 38.6 % (ref 35–47)
HDLC SERPL-MCNC: 42 MG/DL
HGB BLD-MCNC: 12.5 G/DL (ref 11.7–15.7)
HIV 1+2 AB+HIV1 P24 AG SERPL QL IA: NONREACTIVE
LDLC SERPL CALC-MCNC: 180 MG/DL
MCH RBC QN AUTO: 27.6 PG (ref 26.5–33)
MCHC RBC AUTO-ENTMCNC: 32.4 G/DL (ref 31.5–36.5)
MCV RBC AUTO: 85 FL (ref 78–100)
NONHDLC SERPL-MCNC: 203 MG/DL
PLATELET # BLD AUTO: 272 10E9/L (ref 150–450)
POTASSIUM SERPL-SCNC: 3.9 MMOL/L (ref 3.4–5.3)
PROT SERPL-MCNC: 7.8 G/DL (ref 6.8–8.8)
RBC # BLD AUTO: 4.53 10E12/L (ref 3.8–5.2)
SODIUM SERPL-SCNC: 142 MMOL/L (ref 133–144)
TRIGL SERPL-MCNC: 117 MG/DL
TSH SERPL DL<=0.005 MIU/L-ACNC: 0.95 MU/L (ref 0.4–4)
WBC # BLD AUTO: 8 10E9/L (ref 4–11)

## 2018-05-17 PROCEDURE — 84443 ASSAY THYROID STIM HORMONE: CPT | Performed by: FAMILY MEDICINE

## 2018-05-17 PROCEDURE — 87389 HIV-1 AG W/HIV-1&-2 AB AG IA: CPT | Performed by: FAMILY MEDICINE

## 2018-05-17 PROCEDURE — 36415 COLL VENOUS BLD VENIPUNCTURE: CPT | Performed by: FAMILY MEDICINE

## 2018-05-17 PROCEDURE — 82306 VITAMIN D 25 HYDROXY: CPT | Performed by: FAMILY MEDICINE

## 2018-05-17 PROCEDURE — 99386 PREV VISIT NEW AGE 40-64: CPT | Performed by: FAMILY MEDICINE

## 2018-05-17 PROCEDURE — 80061 LIPID PANEL: CPT | Performed by: FAMILY MEDICINE

## 2018-05-17 PROCEDURE — 80053 COMPREHEN METABOLIC PANEL: CPT | Performed by: FAMILY MEDICINE

## 2018-05-17 PROCEDURE — 85027 COMPLETE CBC AUTOMATED: CPT | Performed by: FAMILY MEDICINE

## 2018-05-17 PROCEDURE — 99213 OFFICE O/P EST LOW 20 MIN: CPT | Mod: 25 | Performed by: FAMILY MEDICINE

## 2018-05-17 RX ORDER — CLOTRIMAZOLE AND BETAMETHASONE DIPROPIONATE 10; .64 MG/G; MG/G
CREAM TOPICAL 2 TIMES DAILY
Qty: 45 G | Refills: 0 | Status: SHIPPED | OUTPATIENT
Start: 2018-05-17

## 2018-05-17 RX ORDER — CITALOPRAM HYDROBROMIDE 20 MG/1
20 TABLET ORAL DAILY
Qty: 90 TABLET | Refills: 1 | Status: SHIPPED | OUTPATIENT
Start: 2018-05-17 | End: 2018-07-25

## 2018-05-17 RX ORDER — CITALOPRAM HYDROBROMIDE 20 MG/1
20 TABLET ORAL DAILY
Qty: 90 TABLET | Refills: 1 | Status: SHIPPED | OUTPATIENT
Start: 2018-05-17 | End: 2018-05-17

## 2018-05-17 RX ORDER — ATORVASTATIN CALCIUM 10 MG/1
10 TABLET, FILM COATED ORAL DAILY
Qty: 30 TABLET | Refills: 2 | Status: SHIPPED | OUTPATIENT
Start: 2018-05-17 | End: 2018-07-25

## 2018-05-17 ASSESSMENT — ANXIETY QUESTIONNAIRES
7. FEELING AFRAID AS IF SOMETHING AWFUL MIGHT HAPPEN: NOT AT ALL
6. BECOMING EASILY ANNOYED OR IRRITABLE: NOT AT ALL
5. BEING SO RESTLESS THAT IT IS HARD TO SIT STILL: NOT AT ALL
2. NOT BEING ABLE TO STOP OR CONTROL WORRYING: NOT AT ALL
3. WORRYING TOO MUCH ABOUT DIFFERENT THINGS: NOT AT ALL
GAD7 TOTAL SCORE: 0
1. FEELING NERVOUS, ANXIOUS, OR ON EDGE: NOT AT ALL

## 2018-05-17 ASSESSMENT — PATIENT HEALTH QUESTIONNAIRE - PHQ9: 5. POOR APPETITE OR OVEREATING: NOT AT ALL

## 2018-05-17 NOTE — PROGRESS NOTES
"   SUBJECTIVE:   CC: Giselle Solis is an 53 year old woman who presents for preventive health visit.     Physical   Annual:     Getting at least 3 servings of Calcium per day::  NO    Bi-annual eye exam::  Yes    Dental care twice a year::  Yes    Sleep apnea or symptoms of sleep apnea::  Daytime drowsiness    Diet::  Regular (no restrictions)    Frequency of exercise::  1 day/week    Duration of exercise::  15-30 minutes    Taking medications regularly::  No    Barriers to taking medications::  None    Additional concerns today::  No              PROBLEMS TO ADD ON...    Has been  feeling tired, not sure if could be work schedule, is more sedentary, not getting any exercise. mood is not bad, but has no motivation, sleep is not too bad most days but sometimes she [place] not does not get enough sleep.  . harder to fall sleep at nights and wakes up sometimes.     She is mostly worried about her thyroid. Hs gained weight more recently although not exercising  as much and it could be bc of lack of energy or lack of motivation .  She has been on citalopram for many years, for anxiety and mood and she thinks mood wise she is doing fine on current dose.  she had tried some Wellbutrin while ago, for a brief time , mostly for \"her lack of motivation and energy \" she did not think it worked.  She is comfortable just staying on the same Celexa dose for now.  Just wants some blood test to make sure everything is okay with her    Hyperlipidemia Follow-Up      Rate your low fat/cholesterol diet?: good    Taking statin?  No, she is on pravastatin for a short time couple years ago. had elevated liver at some pint so she was told to stop it. She did see GI speclais and work up was negative .  Liver ultrasound was also normal. . Willing to try again.     Other lipid medications/supplements?:  none    Hypertension Follow-up      Outpatient blood pressures are not being checked at home.  Results are usually ok    Low Salt Diet: no " added salt      Today's PHQ-2 Score:   PHQ-2 ( 1999 [employer]) 5/15/2018   Q1: Little interest or pleasure in doing things 0   Q2: Feeling down, depressed or hopeless 0   PHQ-2 Score 0   Q1: Little interest or pleasure in doing things Not at all   Q2: Feeling down, depressed or hopeless Not at all   PHQ-2 Score 0   Answers for HPI/ROS submitted by the patient on 5/15/2018   PHQ-2 Score: 0      Abuse: Current or Past(Physical, Sexual or Emotional)- No  Do you feel safe in your environment - Yes    Social History   Substance Use Topics     Smoking status: Never Smoker     Smokeless tobacco: Never Used     Alcohol use No     Alcohol Use 5/15/2018   If you drink alcohol do you typically have greater than 3 drinks per day OR greater than 7 drinks per week? No   No flowsheet data found.    Reviewed orders with patient.  Reviewed health maintenance and updated orders accordingly - Yes  Patient Active Problem List   Diagnosis     HYPERLIPIDEMIA LDL GOAL <130     Scalp psoriasis     Generalized anxiety disorder     Inverse psoriasis     Essential hypertension, benign     Morbid obesity with BMI of 40.0-44.9, adult (H)     Past Surgical History:   Procedure Laterality Date     COLONOSCOPY N/A 2/16/2015    Procedure: COLONOSCOPY;  Surgeon: Andrey Ryan MD;  Location:  GI     NO HISTORY OF SURGERY         Social History   Substance Use Topics     Smoking status: Never Smoker     Smokeless tobacco: Never Used     Alcohol use No     Family History   Problem Relation Age of Onset     Adopted: Yes           Patient over age 50, mutual decision to screen reflected in health maintenance.    Pertinent mammograms are reviewed under the imaging tab.  History of abnormal Pap smear: NO - age 30- 65 PAP every 3 years recommended    Reviewed and updated as needed this visit by clinical staff  Tobacco  Allergies  Meds         Reviewed and updated as needed this visit by Provider        Past Medical History:   Diagnosis Date      "Generalised anxiety disorder 2010     Inverse psoriasis 2013     Menopause 2010     Mixed hyperlipidemia      Obesity (BMI 30-39.9)      Scalp psoriasis      Snores     sleep study 2013- no apnea     Unspecified essential hypertension      Uterine leiomyoma - fibroids         Review of Systems  CONSTITUTIONAL: NEGATIVE for fever, chills, change in weight  INTEGUMENTARU/SKIN: NEGATIVE for worrisome rashes, moles or lesions  EYES: NEGATIVE for vision changes or irritation  ENT: NEGATIVE for ear, mouth and throat problems  RESP: NEGATIVE for significant cough or SOB  BREAST: NEGATIVE for masses, tenderness or discharge  CV: NEGATIVE for chest pain, palpitations or peripheral edema  GI: NEGATIVE for nausea, abdominal pain, heartburn, or change in bowel habits  : NEGATIVE for unusual urinary or vaginal symptoms. Periods are regular.  MUSCULOSKELETAL: NEGATIVE for significant arthralgias or myalgia  NEURO: NEGATIVE for weakness, dizziness or paresthesias  ENDOCRINE: POSITIVE  for weight gain and fatigue  and NEGATIVE for cold intolerance, constipation, diarrhea, hair loss and heat intolerance  HEME/ALLERGY/IMMUNE: NEGATIVE for bleeding problems  PSYCHIATRIC: NEGATIVE for changes in mood or affect     OBJECTIVE:   /86  Pulse 86  Temp 97.7  F (36.5  C) (Tympanic)  Ht 5' 1.75\" (1.568 m)  Wt 236 lb (107 kg)  LMP 04/01/2010  SpO2 95%  BMI 43.52 kg/m2  Physical Exam  GENERAL: healthy, alert and no distress  EYES: Eyes grossly normal to inspection, PERRL and conjunctivae and sclerae normal  HENT: ear canals and TM's normal, nose and mouth without ulcers or lesions  NECK: no adenopathy, no asymmetry, masses, or scars and thyroid normal to palpation  RESP: lungs clear to auscultation - no rales, rhonchi or wheezes  BREAST: normal without masses, tenderness or nipple discharge and no palpable axillary masses or adenopathy  CV: regular rate and rhythm, normal S1 S2, no S3 or S4, no murmur, click or rub, no peripheral " edema and peripheral pulses strong  ABDOMEN: soft, nontender, no hepatosplenomegaly, no masses and bowel sounds normal   (female): deferred  MS: no gross musculoskeletal defects noted, no edema  SKIN: no suspicious lesions except she has an erythematous rash underneath her breast area as well as in a crease in the back consistent with candidiasis  NEURO: Normal strength and tone, mentation intact and speech normal  PSYCH: mentation appears normal, affect normal, speech pressured, judgement and insight intact and appearance well groomed    ASSESSMENT/PLAN:   (Z00.00) Routine general medical examination at a health care facility  (primary encounter diagnosis)  Comment:   Plan: Lipid panel reflex to direct LDL Fasting            (R53.83) Other fatigue  Comment:   Plan: CBC with platelets, Vitamin D Deficiency, TSH         with free T4 reflex, Comprehensive metabolic         panel        Discussed possible differential diagnosis for her symptoms. She thinsk it coud be just stress, but she is mostly worried about her thyroid.  She wants to have some labs done.  Follow-up as needed      (I10) Essential hypertension, benign  Comment:   Plan: Stable    (E78.5) Hyperlipidemia LDL goal <130  Comment:   Plan: Lipid panel reflex to direct LDL Fasting      atorvastatin (LIPITOR) 10 MG tablet        Check labs. She is willing to try Lipitor to see how that works.  LFT may be after 4 weeks pain medication.  Recheck lipid in 2-3 months    (E66.01,  Z68.41) Morbid obesity with BMI of 40.0-44.9, adult (H)  Comment:   Plan: Healthy diet and exercise reviewed.  Limit pop and juice intake.  Risks of obesity discussed.  Encourage exercise.        (Z91.89) Lack of motivation  Comment: Declined wellbutrin to see if helps with her motivation.  Plan: encouraged more exercise .       (F41.1) Generalized anxiety disorder  Comment:   Plan: citalopram (CELEXA) 20 MG tablet          Discussed cares, talked about signs and symptoms of anxiety/  "depression and treatment options. Willing to continue the same dose of citalopram at  20 mg for now.  Pros/ cons of med's discussed . . spent sometimes counseling patient. Follow up in 2-3 months, sooner if problem.           (Z12.31) Breast cancer screening  Comment:   Plan: *MA Screening Digital Bilateral            (B37.2) Candidiasis of skin  Comment: under breast and back   Plan: clotrimazole-betamethasone (LOTRISONE) cream            (Z13.9) Screening for condition  Comment:   Plan: HIV Antigen Antibody Combo          Check labs. refill sent.Cares and  treatment discussed follow. up if problem   Patient expressed understanding and agreement with treatment plan. All patient's questions were answered, will let me know if has more later.  Medications: Rx's: Reviewed the potential side effects/complications of medications prescribed.     COUNSELING:  Reviewed preventive health counseling, as reflected in patient instructions       Regular exercise       Healthy diet/nutrition       Vision screening       Hearing screening         reports that she has never smoked. She has never used smokeless tobacco.    Estimated body mass index is 43.52 kg/(m^2) as calculated from the following:    Height as of this encounter: 5' 1.75\" (1.568 m).    Weight as of this encounter: 236 lb (107 kg).   Weight management plan: Discussed healthy diet and exercise guidelines and patient will follow up in 12 months in clinic to re-evaluate.    Counseling Resources:  ATP IV Guidelines  Pooled Cohorts Equation Calculator  Breast Cancer Risk Calculator  FRAX Risk Assessment  ICSI Preventive Guidelines  Dietary Guidelines for Americans, 2010  USDA's MyPlate  ASA Prophylaxis  Lung CA Screening    Livier Tejeda MD  [facility]    "

## 2018-05-17 NOTE — PATIENT INSTRUCTIONS
Prevention Guidelines, Women Ages 50 to 64  Screening tests and vaccines are an important part of managing your health. A screening test is done to find possible disorders or diseases in people who don't have any symptoms. The goal is to find a disease early so lifestyle changes can be made and you can be watched more closely to reduce the risk of disease, or to detect it early enough to treat it most effectively. Screening tests are not considered diagnostic, but are used to determine if more testing is needed. Health counseling is essential, too. Below are guidelines for these, for women ages 50 to 64. Talk with your healthcare provider to make sure you re up to date on what you need.  Screening Who needs it How often   Type 2 diabetes or prediabetes All women beginning at age 45 and women without symptoms at any age who are overweight or obese and have 1 or more additional risk factors for diabetes. At  least every 3 years   Type 2 diabetes or prediabetes All women diagnosed with gestational diabetes Lifelong testing every 3 years   Type 2 diabetes All women with prediabetes Every year   Alcohol misuse All women in this age group At routine exams   Blood pressure All women in this age group Yearly checkup if your blood pressure is normal  Normal blood pressure is less than 120/80 mm Hg  If your blood pressure reading is higher than normal, follow the advice of your healthcare provider   Breast cancer All women at average risk in this age group Yearly mammogram should be done until age 54. At age 55, switch to mammograms every other year or choose to continue yearly mammograms.  All women should be familiar with the potential benefits and risks of breast cancer screening with mammograms.      Cervical cancer All women in this age group, except women who have had a complete hysterectomy Pap test every 3 years or Pap test with human papillomavirus (HPV) test every 5 years   Chlamydia Women at increased risk for  infection At routine exams   Colorectal cancer All women in this age group Flexible sigmoidoscopy every 5 years, or colonoscopy every 10 years, or double-contrast barium enema every 5 years; yearly fecal occult blood test or fecal immunochemical test; or a stool DNA test as often as your health care provider advises; talk with your health care provider about which tests are best for you   Depression All women in this age group At routine exams   Gonorrhea Sexually active women at increased risk for infection At routine exams   Hepatitis C Anyone at increased risk; 1 time for those born between 1945 and 1965 At routine exams   High cholesterol or triglycerides All women in this age group who are at risk for coronary artery disease At least every 5 years   HIV All women At routine exams   Lung cancer Adults age 55 to 80 who have smoked Yearly screening in smokers with 30 pack-year history of smoking or who quit within 15 years   Obesity All women in this age group At routine exams   Osteoporosis Women who are postmenopausal Ask your healthcare provider   Syphilis Women at increased risk for infection   talk with your healthcare provider At routine exams   Tuberculosis Women at increased risk for infection   talk with your healthcare provider Ask your healthcare provider   Vision All women in this age group Ask your healthcare provider   Vaccine Who needs it How often   Chickenpox (varicella) All women in this age group who have no record of this infection or vaccine 2 doses; the second dose should be given at least 4 weeks after the first dose   Hepatitis A Women at increased risk for infection   talk with your healthcare provider 2 doses given at least 6 months apart   Hepatitis B Women at increased risk for infection   talk with your healthcare provider 3 doses over 6 months; second dose should be given 1 month after the first dose; the third dose should be given at least 2 months after the second dose and at least  4 months after the first dose   Haemophilus influenzaeType B (HIB) Women at increased risk for infection   talk with your healthcare provider 1 to 3 doses   Influenza (flu) All women in this age group Once a year   Measles, mumps, rubella (MMR) Women in this age group through their late 50s who have no record of these infections or vaccines 1 dose   Meningococcal Women at increased risk for infection   talk with your healthcare provider 1 or more doses   Pneumococcal conjugate vaccine (PCV13) and pneumococcal polysaccharide vaccine (PPSV23) Women at increased risk for infection   talk with your healthcare provider PCV13: 1 dose ages 19 to 65 (protects against 13 types of pneumococcal bacteria)  PPSV23: 1 to 2 doses through age 64, or 1 dose at 65 or older (protects against 23 types of pneumococcal bacteria)   Tetanus/diphtheria/pertussis (Td/Tdap) booster All women in this age group Td every 10 years, or a one-time dose of Tdap instead of a Td booster after age 18, then Td every 10 years   Zoster All women ages 60 and older 1 dose   Counseling Who needs it How often   BRCA gene mutation testing for breast and ovarian cancer susceptibility Women with increased risk for having gene mutation When your risk is known   Breast cancer and chemoprevention Women at high risk for breast cancer When your risk is known   Diet and exercise Women who are overweight or obese When diagnosed, and then at routine exams   Sexually transmitted infection prevention Women at increased risk for infection   talk with your healthcare provider At routine exams   Use of daily aspirin Women ages 55 and up in this age group who are at risk for cardiovascular health problems such as stroke When your risk is known   Use of tobacco and the health effects it can cause All women in this age group Every exam   1American [employer]  Date Last Reviewed: 1/26/2016 2000-2017 [employer]. [address] [place] [id number]. All  rights reserved. This information is not intended as a substitute for professional medical care. Always follow your healthcare professional's instructions.

## 2018-05-17 NOTE — MR AVS SNAPSHOT
After Visit Summary   5/17/2018    Giselle Solis    MRN: [id number]           Patient Information     Date Of Birth          1965        Visit Information        Provider Department      5/17/2018 8:00 AM Livier Te[place]eda [place] [facility] [place]'s Diagnoses     Routine general medical examination at a health care facility    -  1    Other fatigue        Essential hypertension, benign        Hyperlipidemia LDL goal <130        Morbid obesity with BMI of 40.0-44.9, adult (H)        Generalized anxiety disorder        Lack of motivation        Breast cancer screening        Candidiasis of skin        Screening for condition          Care Instructions      Prevention Guidelines, Women Ages 50 to 64  Screening tests and vaccines are an important part of managing your health. A screening test is done to find possible disorders or diseases in people who don't have any symptoms. The goal is to find a disease early so lifestyle changes can be made and you can be watched more closely to reduce the risk of disease, or to detect it early enough to treat it most effectively. Screening tests are not considered diagnostic, but are used to determine if more testing is needed. Health counseling is essential, too. Below are guidelines for these, for women ages 50 to 64. Talk with your healthcare provider to make sure you re up to date on what you need.  Screening Who needs it How often   Type 2 diabetes or prediabetes All women beginning at age 45 and women without symptoms at any age who are overweight or obese and have 1 or more additional risk factors for diabetes. At  least every 3 years   Type 2 diabetes or prediabetes All women diagnosed with gestational diabetes Lifelong testing every 3 years   Type 2 diabetes All women with prediabetes Every year   Alcohol misuse All women in this age group At routine exams   Blood pressure All women in this age group Yearly checkup if your  blood pressure is normal  Normal blood pressure is less than 120/80 mm Hg  If your blood pressure reading is higher than normal, follow the advice of your healthcare provider   Breast cancer All women at average risk in this age group Yearly mammogram should be done until age 54. At age 55, switch to mammograms every other year or choose to continue yearly mammograms.  All women should be familiar with the potential benefits and risks of breast cancer screening with mammograms.      Cervical cancer All women in this age group, except women who have had a complete hysterectomy Pap test every 3 years or Pap test with human papillomavirus (HPV) test every 5 years   Chlamydia Women at increased risk for infection At routine exams   Colorectal cancer All women in this age group Flexible sigmoidoscopy every 5 years, or colonoscopy every 10 years, or double-contrast barium enema every 5 years; yearly fecal occult blood test or fecal immunochemical test; or a stool DNA test as often as your health care provider advises; talk with your health care provider about which tests are best for you   Depression All women in this age group At routine exams   Gonorrhea Sexually active women at increased risk for infection At routine exams   Hepatitis C Anyone at increased risk; 1 time for those born between 1945 and 1965 At routine exams   High cholesterol or triglycerides All women in this age group who are at risk for coronary artery disease At least every 5 years   HIV All women At routine exams   Lung cancer Adults age 55 to 80 who have smoked Yearly screening in smokers with 30 pack-year history of smoking or who quit within 15 years   Obesity All women in this age group At routine exams   Osteoporosis Women who are postmenopausal Ask your healthcare provider   Syphilis Women at increased risk for infection - talk with your healthcare provider At routine exams   Tuberculosis Women at increased risk for infection - talk with your  healthcare provider Ask your healthcare provider   Vision All women in this age group Ask your healthcare provider   Vaccine Who needs it How often   Chickenpox (varicella) All women in this age group who have no record of this infection or vaccine 2 doses; the second dose should be given at least 4 weeks after the first dose   Hepatitis A Women at increased risk for infection - talk with your healthcare provider 2 doses given at least 6 months apart   Hepatitis B Women at increased risk for infection - talk with your healthcare provider 3 doses over 6 months; second dose should be given 1 month after the first dose; the third dose should be given at least 2 months after the second dose and at least 4 months after the first dose   Haemophilus influenzaeType B (HIB) Women at increased risk for infection - talk with your healthcare provider 1 to 3 doses   Influenza (flu) All women in this age group Once a year   Measles, mumps, rubella (MMR) Women in this age group through their late 50s who have no record of these infections or vaccines 1 dose   Meningococcal Women at increased risk for infection - talk with your healthcare provider 1 or more doses   Pneumococcal conjugate vaccine (PCV13) and pneumococcal polysaccharide vaccine (PPSV23) Women at increased risk for infection - talk with your healthcare provider PCV13: 1 dose ages 19 to 65 (protects against 13 types of pneumococcal bacteria)  PPSV23: 1 to 2 doses through age 64, or 1 dose at 65 or older (protects against 23 types of pneumococcal bacteria)   Tetanus/diphtheria/pertussis (Td/Tdap) booster All women in this age group Td every 10 years, or a one-time dose of Tdap instead of a Td booster after age 18, then Td every 10 years   Zoster All women ages 60 and older 1 dose   Counseling Who needs it How often   BRCA gene mutation testing for breast and ovarian cancer susceptibility Women with increased risk for having gene mutation When your risk is known   Breast  cancer and chemoprevention Women at high risk for breast cancer When your risk is known   Diet and exercise Women who are overweight or obese When diagnosed, and then at routine exams   Sexually transmitted infection prevention Women at increased risk for infection - talk with your healthcare provider At routine exams   Use of daily aspirin Women ages 55 and up in this age group who are at risk for cardiovascular health problems such as stroke When your risk is known   Use of tobacco and the health effects it can cause All women in this age group Every exam   1American [employer]  Date Last Reviewed: 1/26/2016 2000-2017 [employer]. [address] [place] [address]. All rights reserved. This information is not intended as a substitute for professional medical care. Always follow your healthcare professional's instructions.                Follow-ups after your visit        Follow-up notes from your care team     Return in about 3 months (around 8/17/2018) for Lab Work.      Future tests that were ordered for you today     Open Future Orders        Priority Expected Expires Ordered    Hepatic panel Routine  5/17/2019 5/17/2018    *MA Screening Digital Bilateral Routine  5/17/2019 5/17/2018            Who to contact     If you have questions or need follow up information about today's clinic visit or your schedule please contact [facility] [place] directly at [phone].  Normal or non-critical lab and imaging results will be communicated to you by MyChart, letter or phone within 4 business days after the clinic has received the results. If you do not hear from us within 7 days, please contact the clinic through MyChart or phone. If you have a critical or abnormal lab result, we will notify you by phone as soon as possible.  Submit refill requests through [email or web address] or call your pharmacy and they will forward the refill request to us. Please allow 3 business days for your refill  "to be completed.          Additional Information About Your Visit        [employer]Boxhart Information     [employer] gives you secure access to your electronic health record. If you see a primary care provider, you can also send messages to your care team and make appointments. If you have questions, please call your primary care clinic.  If you do not have a primary care provider, please call [phone] and they will assist you.        Care EveryWhere ID     This is your Care EveryWhere ID. This could be used by other organizations to access your [place] medical records  [id number]        Your Vitals Were     Pulse Temperature Height Last Period Pulse Oximetry BMI (Body Mass Index)    86 97.7  F (36.5  C) (Tympanic) 5' 1.75\" (1.568 m) 04/01/2010 95% 43.52 kg/m2       Blood Pressure from Last 3 Encounters:   05/17/18 124/86   07/31/17 128/82   06/30/17 124/86    Weight from Last 3 Encounters:   05/17/18 236 lb (107 kg)   07/31/17 231 lb (104.8 kg)   06/30/17 229 lb (103.9 kg)              We Performed the Following     CBC with platelets     Comprehensive metabolic panel     HIV Antigen Antibody Combo     Lipid panel reflex to direct LDL Fasting     TSH with free T4 reflex     Vitamin D Deficiency          Today's Medication Changes          These changes are accurate as of 5/17/18  8:41 AM.  If you have any questions, ask your nurse or doctor.               Start taking these medicines.        Dose/Directions    atorvastatin 10 MG tablet   Commonly known as:  LIPITOR   Used for:  Hyperlipidemia LDL goal <130   Started by:  Livier Tejeda MD        Dose:  10 mg   Take 1 tablet (10 mg) by mouth daily   Quantity:  30 tablet   Refills:  2       clotrimazole-betamethasone cream   Commonly known as:  LOTRISONE   Used for:  Candidiasis of skin   Started by:  Livier Tejeda MD        Apply topically 2 times daily   Quantity:  45 g   Refills:  0            Where to get your medicines      [facility] medications [id number] [place] [place] - [address]  [address] [facility] [address] [facility] [place] [address]     Phone:  [phone]     atorvastatin 10 MG tablet    clotrimazole-betamethasone cream         Some of these will need a paper prescription and others can be bought over the counter.  Ask your nurse if you have questions.     Bring a paper prescription for each of these medications     citalopram 20 MG tablet                Primary Care Provider Office Phone # Fax #    [facility] [phone]       [facility] [address]        Equal Access to Services     DAVID SHANNON : Hadii aad ku hadasho Sojcarlos, waaxda luqadaha, qaybta kaalmada adeegyada, vasiliy chao. So [facility] [phone].    ATENCIÓN: Si habla español, tiene a pathak disposición servicios gratuitos de asistencia lingüística. Llame al [phone].    We comply with applicable federal civil rights laws and [place] laws. We do not discriminate on the basis of race, color, national origin, age, disability, sex, sexual orientation, or gender identity.            Thank you!     Thank you for choosing [facility] [place]  for your care. Our goal is always to provide you with excellent care. Hearing back from our patients is one way we can continue to improve our services. Please take a few minutes to complete the written survey that you may receive in the mail after your visit with us. Thank you!             Your Updated Medication List - Protect others around you: Learn how to safely use, store and throw away your medicines at www.disposemymeds.org.          This list is accurate as of 5/17/18  8:41 AM.  Always use your most recent med list.                   Brand Name Dispense Instructions for use Diagnosis    aspirin 81 MG tablet      ONE DAILY        atorvastatin 10 MG tablet    LIPITOR    30 tablet    Take 1 tablet (10 mg) by mouth  daily    Hyperlipidemia LDL goal <130       calcium carbonate 500 MG tablet    OS-MADELIN 500 mg [place]. Ca     Take 500 mg by mouth 2 times daily        citalopram 20 MG tablet    celeXA    90 tablet    Take 1 tablet (20 mg) by mouth daily    Generalized anxiety disorder       clotrimazole-betamethasone cream    LOTRISONE    45 g    Apply topically 2 times daily    Candidiasis of skin       losartan 50 MG tablet    COZAAR    90 tablet    Take 1 tablet (50 mg) by mouth daily        Multi-vitamin Tabs tablet   Generic drug:  multivitamin, therapeutic with minerals      1 TABLET DAILY        OMEPRAZOLE PO      Take 20 mg by mouth daily        OSTEO BI-FLEX ADV TRIPLE ST Tabs      Take 2 capsules by mouth.

## 2018-05-18 ASSESSMENT — PATIENT HEALTH QUESTIONNAIRE - PHQ9: SUM OF ALL RESPONSES TO PHQ QUESTIONS 1-9: 5

## 2018-05-18 ASSESSMENT — ANXIETY QUESTIONNAIRES: GAD7 TOTAL SCORE: 0

## 2018-05-22 ENCOUNTER — HOSPITAL ENCOUNTER (OUTPATIENT)
Dept: MAMMOGRAPHY | Facility: CLINIC | Age: 53
Discharge: HOME | End: 2018-05-22
Attending: FAMILY MEDICINE | Admitting: FAMILY MEDICINE
Payer: COMMERCIAL

## 2018-05-22 DIAGNOSIS — Z12.39 BREAST CANCER SCREENING: ICD-10-CM

## 2018-05-22 PROCEDURE — 77067 SCR MAMMO BI INCL CAD: CPT

## 2018-07-17 ENCOUNTER — MYC MEDICAL ADVICE (OUTPATIENT)
Dept: FAMILY MEDICINE | Facility: CLINIC | Age: 53
End: 2018-07-17

## 2018-07-18 ENCOUNTER — MYC MEDICAL ADVICE (OUTPATIENT)
Dept: FAMILY MEDICINE | Facility: CLINIC | Age: 53
End: 2018-07-18

## 2018-07-18 NOTE — TELEPHONE ENCOUNTER
Form received via My Chart. Form filled out and given to Dr Tejeda to sign. (multiple encounters for this form)  Allegra De La Cruz,

## 2018-07-18 NOTE — TELEPHONE ENCOUNTER
There is not a biometric form scanned into the chart and nothing documented. TC left a phone message and sent pt a My Chart message. Requesting pt fax us the form again.  Allegra De La Cruz,

## 2018-07-18 NOTE — TELEPHONE ENCOUNTER
Duplicate request - please see previous encounter (Form received and given to Dr Tejeda)  Allegra De La Cruz,

## 2018-07-19 NOTE — TELEPHONE ENCOUNTER
Form was not signed by patient; waiting on response as to what we should do with the form  Adenike HUNT

## 2018-07-19 NOTE — TELEPHONE ENCOUNTER
Form faxed per patient to Attn: Data Dept [phone]  Original/confirmation/label placed at the  for pickup  Copy sent to stat abstracting  Adenike Aguiar TC

## 2018-07-25 ENCOUNTER — MYC REFILL (OUTPATIENT)
Dept: FAMILY MEDICINE | Facility: CLINIC | Age: 53
End: 2018-07-25

## 2018-07-25 DIAGNOSIS — I10 ESSENTIAL HYPERTENSION, BENIGN: Primary | ICD-10-CM

## 2018-07-25 DIAGNOSIS — E78.5 HYPERLIPIDEMIA LDL GOAL <130: ICD-10-CM

## 2018-07-25 DIAGNOSIS — F41.1 GENERALIZED ANXIETY DISORDER: ICD-10-CM

## 2018-07-25 LAB
ALBUMIN SERPL-MCNC: 3.3 G/DL (ref 3.4–5)
ALP SERPL-CCNC: 83 U/L (ref 40–150)
ALT SERPL W P-5'-P-CCNC: 25 U/L (ref 0–50)
AST SERPL W P-5'-P-CCNC: 19 U/L (ref 0–45)
BILIRUB DIRECT SERPL-MCNC: 0.1 MG/DL (ref 0–0.2)
BILIRUB SERPL-MCNC: 0.5 MG/DL (ref 0.2–1.3)
PROT SERPL-MCNC: 7.4 G/DL (ref 6.8–8.8)

## 2018-07-25 PROCEDURE — 36415 COLL VENOUS BLD VENIPUNCTURE: CPT | Performed by: FAMILY MEDICINE

## 2018-07-25 PROCEDURE — 80076 HEPATIC FUNCTION PANEL: CPT | Performed by: FAMILY MEDICINE

## 2018-07-26 RX ORDER — LOSARTAN POTASSIUM 50 MG/1
50 TABLET ORAL DAILY
Qty: 90 TABLET | Refills: 3 | Status: SHIPPED | OUTPATIENT
Start: 2018-07-26

## 2018-07-26 RX ORDER — ATORVASTATIN CALCIUM 10 MG/1
10 TABLET, FILM COATED ORAL DAILY
Qty: 30 TABLET | Refills: 2 | Status: SHIPPED | OUTPATIENT
Start: 2018-07-26 | End: 2018-07-27

## 2018-07-26 RX ORDER — LOSARTAN POTASSIUM 50 MG/1
50 TABLET ORAL DAILY
Qty: 90 TABLET | Refills: 3 | Status: SHIPPED | OUTPATIENT
Start: 2018-07-26 | End: 2018-07-26

## 2018-07-26 RX ORDER — CITALOPRAM HYDROBROMIDE 20 MG/1
20 TABLET ORAL DAILY
Qty: 90 TABLET | Refills: 1 | Status: SHIPPED | OUTPATIENT
Start: 2018-07-26 | End: 2018-11-21

## 2018-07-26 NOTE — TELEPHONE ENCOUNTER
Message from [employer]:  Original authorizing provider: MD Giselle Hubbard would like a refill of the following medications:  atorvastatin (LIPITOR) 10 MG tablet [Livier Tejeda MD]  citalopram (CELEXA) 20 MG tablet [Livier Tejeda MD]    Preferred [facility]: [facility] - [facility]    Comment:  please fax 90 day refill for all meds thank you    Medication renewals requested in this message routed to other providers:  losartan (COZAAR) 50 MG tablet [ARACELIS Hogan CNP]

## 2018-07-26 NOTE — TELEPHONE ENCOUNTER
Rx's for Lipitor and citalopram faxed to [facility]. My Chart message sent to the pt.  Allegra De La Cruz,

## 2018-07-26 NOTE — TELEPHONE ENCOUNTER
"Requested Prescriptions   Pending Prescriptions Disp Refills     atorvastatin (LIPITOR) 10 MG tablet 30 tablet 2     Sig: Take 1 tablet (10 mg) by mouth daily    Statins Protocol Passed    7/26/2018 11:49 AM       Passed - LDL on file in past 12 months    Recent Labs   Lab Test  05/17/18   0847   LDL  180*            Passed - No abnormal creatine kinase in past 12 months    Recent Labs   Lab Test  08/23/16   1501   CKT  100               Passed - Recent (12 mo) or future (30 days) visit within the authorizing provider's specialty    Patient had office visit in the last 12 months or has a visit in the next 30 days with authorizing provider or within the authorizing provider's specialty.  See \"Patient Info\" tab in inbasket, or \"Choose Columns\" in Meds & Orders section of the refill encounter.           Passed - Patient is age 18 or older       Passed - No active pregnancy on record       Passed - No positive pregnancy test in past 12 months        citalopram (CELEXA) 20 MG tablet 90 tablet 1     Sig: Take 1 tablet (20 mg) by mouth daily    SSRIs Protocol Passed    7/26/2018 11:49 AM       Passed - Recent (12 mo) or future (30 days) visit within the authorizing provider's specialty    Patient had office visit in the last 12 months or has a visit in the next 30 days with authorizing provider or within the authorizing provider's specialty.  See \"Patient Info\" tab in inbasket, or \"Choose Columns\" in Meds & Orders section of the refill encounter.           Passed - Patient is age 18 or older       Passed - No active pregnancy on record       Passed - No positive pregnancy test in last 12 months        Routing refill request to provider for review/approval because:  Labs out of range:  ldl- last notes says recheck 2-3 months- new med start.    Heather Gould,RN BSN  [facility]  [phone]          "

## 2018-07-26 NOTE — TELEPHONE ENCOUNTER
Message from [email or web address]:  Original authorizing provider: ARACELIS Hogan CNP    Giselle Will would like a refill of the following medications:  losartan (COZAAR) 50 MG tablet [ARACELIS Hogan CNP]    Preferred pharmacy: [facility] - [facility]    Comment:  please fax 90 day refill for all meds thank you    Medication renewals requested in this message routed to other providers:  atorvastatin (LIPITOR) 10 MG tablet [Livier Tejeda MD]  citalopram (CELEXA) 20 MG tablet [Livier Tejeda MD]

## 2018-07-26 NOTE — TELEPHONE ENCOUNTER
"Requested Prescriptions   Pending Prescriptions Disp Refills     losartan (COZAAR) 50 MG tablet 90 tablet 3     Sig: Take 1 tablet (50 mg) by mouth daily    Angiotensin-II Receptors Passed    7/26/2018 11:48 AM       Passed - Blood pressure under 140/90 in past 12 months    BP Readings from Last 3 Encounters:   05/17/18 124/86   07/31/17 128/82   06/30/17 124/86                Passed - Recent (12 mo) or future (30 days) visit within the authorizing provider's specialty    Patient had office visit in the last 12 months or has a visit in the next 30 days with authorizing provider or within the authorizing provider's specialty.  See \"Patient Info\" tab in inbasket, or \"Choose Columns\" in Meds & Orders section of the refill encounter.           Passed - Patient is age 18 or older       Passed - No active pregnancy on record       Passed - Normal serum creatinine on file in past 12 months    Recent Labs   Lab Test  05/17/18   0847   CR  0.76            Passed - Normal serum potassium on file in past 12 months    Recent Labs   Lab Test  05/17/18   0847   POTASSIUM  3.9                   Passed - No positive pregnancy test in past 12 months        Last Written Prescription Date:  06/30/17  Last Fill Quantity: 90,  # refills: 3   Last office visit: 5/17/2018 with prescribing provider:     Future Office Visit:      "

## 2018-07-27 ENCOUNTER — MYC REFILL (OUTPATIENT)
Dept: FAMILY MEDICINE | Facility: CLINIC | Age: 53
End: 2018-07-27

## 2018-07-27 DIAGNOSIS — E78.5 HYPERLIPIDEMIA LDL GOAL <130: ICD-10-CM

## 2018-07-27 NOTE — TELEPHONE ENCOUNTER
"Requested Prescriptions   Pending Prescriptions Disp Refills     atorvastatin (LIPITOR) 10 MG tablet 30 tablet 2    Last Written Prescription Date:  07/26/2018  Last Fill Quantity: 30 tablet,  # refills: 2   Last office visit: 5/17/2018 with prescribing provider:  Livier Tejeda   Future Office Visit:     Sig: Take 1 tablet (10 mg) by mouth daily    Statins Protocol Passed    7/27/2018  8:41 AM       Passed - LDL on file in past 12 months    Recent Labs   Lab Test  05/17/18   0847   LDL  180*            Passed - No abnormal creatine kinase in past 12 months    Recent Labs   Lab Test  08/23/16   1501   CKT  100               Passed - Recent (12 mo) or future (30 days) visit within the authorizing provider's specialty    Patient had office visit in the last 12 months or has a visit in the next 30 days with authorizing provider or within the authorizing provider's specialty.  See \"Patient Info\" tab in inbasket, or \"Choose Columns\" in Meds & Orders section of the refill encounter.           Passed - Patient is age 18 or older       Passed - No active pregnancy on record       Passed - No positive pregnancy test in past 12 months          "

## 2018-07-27 NOTE — TELEPHONE ENCOUNTER
Message from [employer]:  Original authorizing provider: Cali Viera MD    Giselle Will would like a refill of the following medications:  atorvastatin (LIPITOR) 10 MG tablet [Cali Viera MD]    Preferred pharmacy: [facility] - [facility]    Comment:  90 day script please. did not see request on website yet

## 2018-07-30 NOTE — TELEPHONE ENCOUNTER
Routing refill request to provider for review/approval because:  Labs out of range:  ldl above goal  Heather Gould,RN BSN  [facility]  [phone]

## 2018-07-31 RX ORDER — ATORVASTATIN CALCIUM 10 MG/1
10 TABLET, FILM COATED ORAL DAILY
Qty: 30 TABLET | Refills: 2 | Status: SHIPPED | OUTPATIENT
Start: 2018-07-31 | End: 2018-10-08

## 2018-07-31 NOTE — TELEPHONE ENCOUNTER
[facility]  Date:July 31, 2018  Signature:Adenike HUNT

## 2018-08-07 NOTE — TELEPHONE ENCOUNTER
Fax request received from [facility] again  TC refaxed script and received fax confirmation that it went through  Adenike HUNT

## 2018-10-08 ENCOUNTER — MYC REFILL (OUTPATIENT)
Dept: FAMILY MEDICINE | Facility: CLINIC | Age: 53
End: 2018-10-08

## 2018-10-08 DIAGNOSIS — E78.5 HYPERLIPIDEMIA LDL GOAL <130: ICD-10-CM

## 2018-10-08 RX ORDER — ATORVASTATIN CALCIUM 10 MG/1
10 TABLET, FILM COATED ORAL DAILY
Qty: 30 TABLET | Refills: 2 | Status: CANCELLED | OUTPATIENT
Start: 2018-10-08

## 2018-10-08 NOTE — TELEPHONE ENCOUNTER
"Requested Prescriptions   Pending Prescriptions Disp Refills     atorvastatin (LIPITOR) 10 MG tablet  Last Written Prescription Date:  10-8-2018  Last Fill Quantity: 30 tablet,  # refills: 0   Last office visit: 5/17/2018 with prescribing provider:     Future Office Visit:     30 tablet 2     Sig: Take 1 tablet (10 mg) by mouth daily    Statins Protocol Passed    10/8/2018  6:41 AM       Passed - LDL on file in past 12 months    Recent Labs   Lab Test  05/17/18   0847   LDL  180*            Passed - No abnormal creatine kinase in past 12 months    Recent Labs   Lab Test  08/23/16   1501   CKT  100               Passed - Recent (12 mo) or future (30 days) visit within the authorizing provider's specialty    Patient had office visit in the last 12 months or has a visit in the next 30 days with authorizing provider or within the authorizing provider's specialty.  See \"Patient Info\" tab in inbasket, or \"Choose Columns\" in Meds & Orders section of the refill encounter.           Passed - Patient is age 18 or older       Passed - No active pregnancy on record       Passed - No positive pregnancy test in past 12 months          "

## 2018-10-08 NOTE — TELEPHONE ENCOUNTER
Message from [employer]:  Original authorizing provider: Cali Viera MD    Giselle Will would like a refill of the following medications:  atorvastatin (LIPITOR) 10 MG tablet [Cali Viera MD]    Preferred pharmacy: [facility]    Comment:  90 days supply. requested labs completed

## 2018-10-09 ENCOUNTER — MYC REFILL (OUTPATIENT)
Dept: FAMILY MEDICINE | Facility: CLINIC | Age: 53
End: 2018-10-09

## 2018-10-09 DIAGNOSIS — E78.5 HYPERLIPIDEMIA LDL GOAL <130: ICD-10-CM

## 2018-10-09 RX ORDER — ATORVASTATIN CALCIUM 10 MG/1
TABLET, FILM COATED ORAL
Qty: 30 TABLET | Refills: 0 | Status: CANCELLED | OUTPATIENT
Start: 2018-10-09

## 2018-10-09 NOTE — TELEPHONE ENCOUNTER
"Requested Prescriptions   Pending Prescriptions Disp Refills     atorvastatin (LIPITOR) 10 MG tablet  Last Written Prescription Date:  10-8-2018  Last Fill Quantity: 30 tablet,  # refills: 0   Last office visit: 5/17/2018 with prescribing provider:     Future Office Visit:     30 tablet 0    Statins Protocol Passed    10/9/2018  7:19 AM       Passed - LDL on file in past 12 months    Recent Labs   Lab Test  05/17/18   0847   LDL  180*            Passed - No abnormal creatine kinase in past 12 months    Recent Labs   Lab Test  08/23/16   1501   CKT  100               Passed - Recent (12 mo) or future (30 days) visit within the authorizing provider's specialty    Patient had office visit in the last 12 months or has a visit in the next 30 days with authorizing provider or within the authorizing provider's specialty.  See \"Patient Info\" tab in inbasket, or \"Choose Columns\" in Meds & Orders section of the refill encounter.           Passed - Patient is age 18 or older       Passed - No active pregnancy on record       Passed - No positive pregnancy test in past 12 months          "

## 2018-10-09 NOTE — TELEPHONE ENCOUNTER
Message from [employer]:  Original authorizing provider: MD Martha Newella Will would like a refill of the following medications:  atorvastatin (LIPITOR) 10 MG tablet [Cali Viera MD]    Preferred pharmacy: [facility] - [address]    Comment:  90 days

## 2018-11-13 DIAGNOSIS — F41.1 GENERALIZED ANXIETY DISORDER: ICD-10-CM

## 2018-11-13 RX ORDER — CITALOPRAM HYDROBROMIDE 20 MG/1
TABLET ORAL
Qty: 90 TABLET | Refills: 1 | OUTPATIENT
Start: 2018-11-13

## 2018-11-13 NOTE — TELEPHONE ENCOUNTER
"Requested Prescriptions   Pending Prescriptions Disp Refills     citalopram (CELEXA) 20 MG tablet [Pharmacy Med Name: CITALOPRAM HBR TABS 20MG] 90 tablet 1     Sig: TAKE 1 TABLET DAILY    SSRIs Protocol Passed    11/13/2018 12:44 AM   Last Written Prescription Date:  7/26/18  Last Fill Quantity: 90,  # refills: 1   Last office visit: 5/17/2018 with prescribing provider:  MARIA INES Tejeda  Future Office Visit:              Passed - Recent (12 mo) or future (30 days) visit within the authorizing provider's specialty    Patient had office visit in the last 12 months or has a visit in the next 30 days with authorizing provider or within the authorizing provider's specialty.  See \"Patient Info\" tab in inbasket, or \"Choose Columns\" in Meds & Orders section of the refill encounter.             Passed - Patient is age 18 or older       Passed - No active pregnancy on record       Passed - No positive pregnancy test in last 12 months          "

## 2018-11-13 NOTE — TELEPHONE ENCOUNTER
Rx denied to pharmacy since should have a refill left at pharmacy from 7/26/18 rx # 90 with 1 refill.    Carmina SHORE RN  EP Triage

## 2018-11-21 ENCOUNTER — MYC REFILL (OUTPATIENT)
Dept: FAMILY MEDICINE | Facility: CLINIC | Age: 53
End: 2018-11-21

## 2018-11-21 DIAGNOSIS — F41.1 GENERALIZED ANXIETY DISORDER: ICD-10-CM

## 2018-11-21 RX ORDER — CITALOPRAM HYDROBROMIDE 20 MG/1
20 TABLET ORAL DAILY
Qty: 90 TABLET | Refills: 0 | Status: SHIPPED | OUTPATIENT
Start: 2018-11-21 | End: 2018-11-21

## 2018-11-21 RX ORDER — CITALOPRAM HYDROBROMIDE 20 MG/1
20 TABLET ORAL DAILY
Qty: 90 TABLET | Refills: 0 | Status: SHIPPED | OUTPATIENT
Start: 2018-11-21 | End: 2019-02-21

## 2018-11-21 NOTE — TELEPHONE ENCOUNTER
Message from [employer]:  Original authorizing provider: Cali Viera MD    Giselle Will would like a refill of the following medications:  citalopram (CELEXA) 20 MG tablet [Cali Viera MD]    Preferred pharmacy: [facility] - USE FOR MAILING ONLY - [facility]    Comment:  please fill 90 days[facility] order. need ASAP. thank you

## 2018-11-21 NOTE — TELEPHONE ENCOUNTER
"Requested Prescriptions   Pending Prescriptions Disp Refills     citalopram (CELEXA) 20 MG tablet  Last Written Prescription Date:  7/26/18  Last Fill Quantity: 90,  # refills: 1   Last office visit: 5/17/2018 with prescribing provider:  Nikhil   Future Office Visit:     90 tablet 1     Sig: Take 1 tablet (20 mg) by mouth daily    SSRIs Protocol Passed    11/21/2018  8:12 AM       Passed - Recent (12 mo) or future (30 days) visit within the authorizing provider's specialty    Patient had office visit in the last 12 months or has a visit in the next 30 days with authorizing provider or within the authorizing provider's specialty.  See \"Patient Info\" tab in inbasket, or \"Choose Columns\" in Meds & Orders section of the refill encounter.             Passed - Patient is age 18 or older       Passed - No active pregnancy on record       Passed - No positive pregnancy test in last 12 months          "

## 2018-11-21 NOTE — TELEPHONE ENCOUNTER
S/w [facility] and they did not have rx from 7/26/18.    Rx escribed to pharmacy.    Carmina SHORE RN  EP Triage

## 2019-04-18 ENCOUNTER — MYC REFILL (OUTPATIENT)
Dept: FAMILY MEDICINE | Facility: CLINIC | Age: 54
End: 2019-04-18

## 2019-04-18 DIAGNOSIS — E78.5 HYPERLIPIDEMIA LDL GOAL <130: ICD-10-CM

## 2019-04-18 RX ORDER — ATORVASTATIN CALCIUM 10 MG/1
TABLET, FILM COATED ORAL
Qty: 60 TABLET | Refills: 0 | Status: SHIPPED | OUTPATIENT
Start: 2019-04-18

## 2019-04-18 NOTE — TELEPHONE ENCOUNTER
"Requested Prescriptions   Pending Prescriptions Disp Refills     atorvastatin (LIPITOR) 10 MG tablet 30 tablet 0     Sig: TAKE 1 TABLET DAILY (NEED FOLLOW UP LABS)   Last Written Prescription Date:  3/4/19  Last Fill Quantity: 30,  # refills: 0   Last office visit: 5/17/2018 with prescribing provider:  Nikhil   Future Office Visit:   Next 5 appointments (look out 90 days)    Jun 05, 2019 12:00 PM CDT  [facility] with Livier Tejeda MD  [facility] [place] ([address] [id number]  [phone]             Statins Protocol Passed - 4/18/2019  5:32 AM        Passed - LDL on file in past 12 months     Recent Labs   Lab Test 05/17/18  0847   *             Passed - No abnormal creatine kinase in past 12 months     Recent Labs   Lab Test 08/23/16  1501                   Passed - Recent (12 mo) or future (30 days) visit within the authorizing provider's specialty     Patient had office visit in the last 12 months or has a visit in the next 30 days with authorizing provider or within the authorizing provider's specialty.  See \"Patient Info\" tab in inbasket, or \"Choose Columns\" in Meds & Orders section of the refill encounter.              Passed - Medication is active on med list        Passed - Patient is age 18 or older        Passed - No active pregnancy on record        Passed - No positive pregnancy test in past 12 months        Medication is being filled for 1 time refill only due to:  Patient needs to be seen because due for fasting labs and office visit for further refills - of note, patient has appointment scheduled for 6/5/19.   BAILEE AnnaN, RN  [facility] [place] [facility] [place]      "

## 2019-05-03 ENCOUNTER — HEALTH MAINTENANCE LETTER (OUTPATIENT)
Age: 54
End: 2019-05-03

## 2019-11-06 ENCOUNTER — HEALTH MAINTENANCE LETTER (OUTPATIENT)
Age: 54
End: 2019-11-06

## 2020-02-16 ENCOUNTER — HEALTH MAINTENANCE LETTER (OUTPATIENT)
Age: 55
End: 2020-02-16

## 2020-11-29 ENCOUNTER — HEALTH MAINTENANCE LETTER (OUTPATIENT)
Age: 55
End: 2020-11-29

## 2021-09-19 ENCOUNTER — HEALTH MAINTENANCE LETTER (OUTPATIENT)
Age: 56
End: 2021-09-19

## 2022-01-09 ENCOUNTER — HEALTH MAINTENANCE LETTER (OUTPATIENT)
Age: 57
End: 2022-01-09

## 2022-11-21 ENCOUNTER — HEALTH MAINTENANCE LETTER (OUTPATIENT)
Age: 57
End: 2022-11-21

## 2022-12-25 ENCOUNTER — HEALTH MAINTENANCE LETTER (OUTPATIENT)
Age: 57
End: 2022-12-25

## 2023-04-16 ENCOUNTER — HEALTH MAINTENANCE LETTER (OUTPATIENT)
Age: 58
End: 2023-04-16

## 2024-10-23 ASSESSMENT — PATIENT HEALTH QUESTIONNAIRE - PHQ9: SUM OF ALL RESPONSES TO PHQ QUESTIONS 1-9: 5
